# Patient Record
Sex: MALE
[De-identification: names, ages, dates, MRNs, and addresses within clinical notes are randomized per-mention and may not be internally consistent; named-entity substitution may affect disease eponyms.]

---

## 2022-11-22 ENCOUNTER — APPOINTMENT (OUTPATIENT)
Dept: UROLOGY | Facility: CLINIC | Age: 67
End: 2022-11-22

## 2022-11-22 VITALS
OXYGEN SATURATION: 98 % | WEIGHT: 215 LBS | BODY MASS INDEX: 26.73 KG/M2 | SYSTOLIC BLOOD PRESSURE: 147 MMHG | TEMPERATURE: 97.3 F | DIASTOLIC BLOOD PRESSURE: 88 MMHG | HEIGHT: 75 IN | HEART RATE: 77 BPM

## 2022-11-22 DIAGNOSIS — Z00.00 ENCOUNTER FOR GENERAL ADULT MEDICAL EXAMINATION W/OUT ABNORMAL FINDINGS: ICD-10-CM

## 2022-11-22 DIAGNOSIS — Z63.5 DISRUPTION OF FAMILY BY SEPARATION AND DIVORCE: ICD-10-CM

## 2022-11-22 PROCEDURE — 51798 US URINE CAPACITY MEASURE: CPT

## 2022-11-22 PROCEDURE — 99205 OFFICE O/P NEW HI 60 MIN: CPT

## 2022-11-22 SDOH — SOCIAL STABILITY - SOCIAL INSECURITY: DISRUPTION OF FAMILY BY SEPARATION AND DIVORCE: Z63.5

## 2022-11-22 NOTE — ASSESSMENT
[FreeTextEntry1] : Diagnosis: \par elevated PSA\par BPH\par Urinary retention, frequency, nocturia, weak stream\par Bladder stones\par Bladder diverticulum\par \par \par Plan \par PSA, MRI \par If low suspicion for prostate cancer based on MRI/PSA, then will recommend an outlet procedure (e.g. TURP) with laser cystolithopaxy. \par \par Return to office post MRI for final assessment and plan. \par \par Favio Lopez MD, FACS, FRCS \par  of Urology [facility]\par Director of Laparoscopic and Robotic Surgery \par [facility] Director of Urology, [facility] \par Professor of Urology\par \par (Office) [phone]\par (Cell)  [phone] \par [email or web address] \par \par

## 2022-11-22 NOTE — HISTORY OF PRESENT ILLNESS
[FreeTextEntry1] : Dr. Herbert Reyes\par \par Dr. Sebastián Ni\par \par Dr. Kenny Lam\par \par \par CC: Elevated PSA, BPH, bladder diverticulum, bladder stones, urinary retention\par \par HPI: \par Patient with history of elevated PSA, MRI one year ago without suspicious lesions (PIRAD1), and fairly severe urinary symptoms, with small bladder diverticulum and bladder stones.  Recurrent UTI, and urosepsis 8/2022. \par \par He has associated intermittent stream, hesitancy, straining, weak stream, "takes like 15 min to go", "squeezing it out", frequent UTI.  \par \par MRI PIRAD1 and 37 cc prostate with intravesical component, one year ago.  CT with bladder stones up to 1.3 cm; 1.7 cm diverticulum, trabeculated bladder, no LAD, prostate 5.1 cm. \par \par PVR 31 cc \par \par FAMHX: brother had prostate cancer (surgery) doing well \par SURGHX: jaw surgery \par SOCIAL: nonsmoker, retired ,  \par ROS: negative 10 system other than above, and hernia \par \par \par

## 2022-11-22 NOTE — PHYSICAL EXAM
[General Appearance - Well Developed] : well developed [General Appearance - Well Nourished] : well nourished [Normal Appearance] : normal appearance [Well Groomed] : well groomed [General Appearance - In No Acute Distress] : no acute distress [Edema] : no peripheral edema [] : no respiratory distress [Respiration, Rhythm And Depth] : normal respiratory rhythm and effort [Exaggerated Use Of Accessory Muscles For Inspiration] : no accessory muscle use [Abdomen Soft] : soft [Abdomen Tenderness] : non-tender [Abdomen Mass (___ Cm)] : no abdominal mass palpated [Abdomen Hernia] : no hernia was discovered [Costovertebral Angle Tenderness] : no ~M costovertebral angle tenderness [Urethral Meatus] : meatus normal [Penis Abnormality] : normal circumcised penis [Urinary Bladder Findings] : the bladder was normal on palpation [Scrotum] : the scrotum was normal [Epididymis] : the epididymides were normal [Testes Tenderness] : no tenderness of the testes [Testes Mass (___cm)] : there were no testicular masses [No Prostate Nodules] : no prostate nodules [Prostate Size ___ gm] : prostate size [unfilled] gm [Normal Station and Gait] : the gait and station were normal for the patient's age [No Focal Deficits] : no focal deficits [Oriented To Time, Place, And Person] : oriented to person, place, and time [Affect] : the affect was normal [Mood] : the mood was normal [Not Anxious] : not anxious [No Palpable Adenopathy] : no palpable adenopathy

## 2022-11-23 ENCOUNTER — NON-APPOINTMENT (OUTPATIENT)
Age: 67
End: 2022-11-23

## 2022-12-07 LAB — BACTERIA UR CULT: ABNORMAL

## 2023-01-17 ENCOUNTER — APPOINTMENT (OUTPATIENT)
Dept: UROLOGY | Facility: CLINIC | Age: 68
End: 2023-01-17
Payer: MEDICARE

## 2023-01-17 VITALS
HEART RATE: 93 BPM | DIASTOLIC BLOOD PRESSURE: 86 MMHG | SYSTOLIC BLOOD PRESSURE: 145 MMHG | OXYGEN SATURATION: 98 % | TEMPERATURE: 98 F

## 2023-01-17 PROCEDURE — 99214 OFFICE O/P EST MOD 30 MIN: CPT

## 2023-01-17 NOTE — HISTORY OF PRESENT ILLNESS
[FreeTextEntry1] : Dr. eHrbert Reyes\par \par Dr. Sebastián Ni\par \par Dr. Kenny Lam\par \par \par \par CC: Elevated PSA, BPH, bladder diverticulum, bladder stones, urinary retention\par \par July 2022 was hospitalized for low potassium and UTI and August 2022 hospitalized for UTI as well.\par With UTI reports weakness, " couldn't move",  ? fever\par \par He has associated intermittent stream, hesitancy, straining, weak stream, "takes like 15 min to go", "squeezing it out", nocturia, frequent UTI. \par \par Reports some dysuria at beginning of stream \par \par Prior imaging:\par MRI PIRAD1 and 37 cc prostate with intravesical component, one year ago. CT with bladder stones up to 1.3 cm; 1.7 cm diverticulum, trabeculated bladder, no LAD, prostate 5.1 cm. \par \par MRI prostate 1/12/23\par 45.0 cc gland\par PIRADs 2 lesion\par left inguinal hernia \par \par \par FAMHX: brother had prostate cancer (surgery) doing well \par SURGHX: jaw surgery \par SOCIAL: nonsmoker, retired ,  \par

## 2023-01-17 NOTE — ASSESSMENT
[FreeTextEntry1] : Diagnosis: \par BPH, LUTs, history of UTI, bladder stones\par \par \par Plan \par Discussed outlet procedure options, r/b/a and all comprehensive complications and side effects.  Discussed laser lithotripsy for his bladder stones. \par \par We discussed surveillance, all medical therapeutic options, all outlet procedures including office based, TURP, bipolar TURP, button vaporization, Rezum, Aquablation, Urolift, thulium/holmium, suprapubic/retropubic simple (open, robotic) prostatectomy.   \par \par Potential side effects of treatment were reviewed including, but not limited to: short term or permanent stress urinary incontinence and/or short term or permanent erectile dysfunction, rectal symptoms/pain, perineal pain, bleeding, rectal injury, recognized vs. unrecognized/delayed-recognition injury, TUR syndrome, risks of DVT, PE, MI, death, risks of cardiopulmonary/anesthesia related complications, positional injury, infection, ureteral injury/obstruction, bladder neck contracture, meatal stenosis, urethral stricture and other complications. \par \par My personal and institutional experience reviewed, as well as literature regarding these options.\par \par Plan for TURP and laser cystolitholopaxy\par \par Favio Lopez MD, FACS, FRCS \par  of Urology [facility]\par Director of Laparoscopic and Robotic Surgery \par [facility] Director of Urology, [facility] \par Professor of Urology\par \par (Office) [phone]\par (Cell)  [phone] \par [email or web address] \par \par \par

## 2023-02-13 LAB
ALBUMIN SERPL ELPH-MCNC: 4.6 G/DL
ALP BLD-CCNC: 137 U/L
ALT SERPL-CCNC: 10 U/L
ANION GAP SERPL CALC-SCNC: 16 MMOL/L
APPEARANCE: CLEAR
APTT BLD: 41.1 SEC
AST SERPL-CCNC: 18 U/L
BACTERIA UR CULT: ABNORMAL
BACTERIA: NEGATIVE
BASOPHILS # BLD AUTO: 0.06 K/UL
BASOPHILS NFR BLD AUTO: 0.6 %
BILIRUB SERPL-MCNC: 1.1 MG/DL
BILIRUBIN URINE: NEGATIVE
BLOOD URINE: ABNORMAL
BUN SERPL-MCNC: 13 MG/DL
CALCIUM SERPL-MCNC: 9.7 MG/DL
CHLORIDE SERPL-SCNC: 99 MMOL/L
CO2 SERPL-SCNC: 24 MMOL/L
COLOR: YELLOW
CREAT SERPL-MCNC: 0.96 MG/DL
EGFR: 87 ML/MIN/1.73M2
EOSINOPHIL # BLD AUTO: 0.23 K/UL
EOSINOPHIL NFR BLD AUTO: 2.5 %
GLUCOSE QUALITATIVE U: NEGATIVE
GLUCOSE SERPL-MCNC: 79 MG/DL
HCT VFR BLD CALC: 43.5 %
HGB BLD-MCNC: 14.9 G/DL
HYALINE CASTS: 1 /LPF
IMM GRANULOCYTES NFR BLD AUTO: 0.5 %
KETONES URINE: NEGATIVE
LEUKOCYTE ESTERASE URINE: ABNORMAL
LYMPHOCYTES # BLD AUTO: 1.81 K/UL
LYMPHOCYTES NFR BLD AUTO: 19.5 %
MAN DIFF?: NORMAL
MCHC RBC-ENTMCNC: 29.4 PG
MCHC RBC-ENTMCNC: 34.3 GM/DL
MCV RBC AUTO: 86 FL
MICROSCOPIC-UA: NORMAL
MONOCYTES # BLD AUTO: 0.71 K/UL
MONOCYTES NFR BLD AUTO: 7.7 %
NEUTROPHILS # BLD AUTO: 6.42 K/UL
NEUTROPHILS NFR BLD AUTO: 69.2 %
NITRITE URINE: POSITIVE
PH URINE: 6.5
PLATELET # BLD AUTO: 326 K/UL
POTASSIUM SERPL-SCNC: 3 MMOL/L
PROT SERPL-MCNC: 7.6 G/DL
PROTEIN URINE: NORMAL
RBC # BLD: 5.06 M/UL
RBC # FLD: 12.8 %
RED BLOOD CELLS URINE: 4 /HPF
SODIUM SERPL-SCNC: 140 MMOL/L
SPECIFIC GRAVITY URINE: 1.02
SQUAMOUS EPITHELIAL CELLS: 1 /HPF
UROBILINOGEN URINE: NORMAL
WBC # FLD AUTO: 9.28 K/UL
WHITE BLOOD CELLS URINE: 3 /HPF

## 2023-02-21 ENCOUNTER — EMERGENCY (EMERGENCY)
Facility: HOSPITAL | Age: 68
LOS: 1 days | Discharge: HOME | End: 2023-02-21
Attending: STUDENT IN AN ORGANIZED HEALTH CARE EDUCATION/TRAINING PROGRAM | Admitting: STUDENT IN AN ORGANIZED HEALTH CARE EDUCATION/TRAINING PROGRAM
Payer: MEDICARE

## 2023-02-21 VITALS
WEIGHT: 214.95 LBS | DIASTOLIC BLOOD PRESSURE: 85 MMHG | HEIGHT: 74 IN | RESPIRATION RATE: 18 BRPM | TEMPERATURE: 98 F | OXYGEN SATURATION: 94 % | HEART RATE: 70 BPM | SYSTOLIC BLOOD PRESSURE: 165 MMHG

## 2023-02-21 VITALS
DIASTOLIC BLOOD PRESSURE: 87 MMHG | HEART RATE: 59 BPM | TEMPERATURE: 98 F | SYSTOLIC BLOOD PRESSURE: 160 MMHG | RESPIRATION RATE: 17 BRPM | OXYGEN SATURATION: 98 %

## 2023-02-21 DIAGNOSIS — Z87.19 PERSONAL HISTORY OF OTHER DISEASES OF THE DIGESTIVE SYSTEM: ICD-10-CM

## 2023-02-21 DIAGNOSIS — N39.0 URINARY TRACT INFECTION, SITE NOT SPECIFIED: ICD-10-CM

## 2023-02-21 DIAGNOSIS — Z87.438 PERSONAL HISTORY OF OTHER DISEASES OF MALE GENITAL ORGANS: ICD-10-CM

## 2023-02-21 DIAGNOSIS — K40.90 UNILATERAL INGUINAL HERNIA, WITHOUT OBSTRUCTION OR GANGRENE, NOT SPECIFIED AS RECURRENT: ICD-10-CM

## 2023-02-21 DIAGNOSIS — R11.0 NAUSEA: ICD-10-CM

## 2023-02-21 DIAGNOSIS — R22.2 LOCALIZED SWELLING, MASS AND LUMP, TRUNK: ICD-10-CM

## 2023-02-21 LAB
ALBUMIN SERPL ELPH-MCNC: 4.3 G/DL — SIGNIFICANT CHANGE UP (ref 3.3–5)
ALP SERPL-CCNC: 132 U/L — HIGH (ref 40–120)
ALT FLD-CCNC: 11 U/L — SIGNIFICANT CHANGE UP (ref 10–45)
ANION GAP SERPL CALC-SCNC: 12 MMOL/L — SIGNIFICANT CHANGE UP (ref 5–17)
APPEARANCE UR: CLEAR — SIGNIFICANT CHANGE UP
AST SERPL-CCNC: 15 U/L — SIGNIFICANT CHANGE UP (ref 10–40)
BACTERIA # UR AUTO: ABNORMAL /HPF
BASOPHILS # BLD AUTO: 0.07 K/UL — SIGNIFICANT CHANGE UP (ref 0–0.2)
BASOPHILS NFR BLD AUTO: 0.7 % — SIGNIFICANT CHANGE UP (ref 0–2)
BILIRUB SERPL-MCNC: 1.7 MG/DL — HIGH (ref 0.2–1.2)
BILIRUB UR-MCNC: NEGATIVE — SIGNIFICANT CHANGE UP
BUN SERPL-MCNC: 12 MG/DL — SIGNIFICANT CHANGE UP (ref 7–23)
CALCIUM SERPL-MCNC: 9.4 MG/DL — SIGNIFICANT CHANGE UP (ref 8.4–10.5)
CHLORIDE SERPL-SCNC: 100 MMOL/L — SIGNIFICANT CHANGE UP (ref 96–108)
CO2 SERPL-SCNC: 26 MMOL/L — SIGNIFICANT CHANGE UP (ref 22–31)
COLOR SPEC: YELLOW — SIGNIFICANT CHANGE UP
COMMENT - URINE: SIGNIFICANT CHANGE UP
CREAT SERPL-MCNC: 0.95 MG/DL — SIGNIFICANT CHANGE UP (ref 0.5–1.3)
DIFF PNL FLD: ABNORMAL
EGFR: 88 ML/MIN/1.73M2 — SIGNIFICANT CHANGE UP
EOSINOPHIL # BLD AUTO: 0.11 K/UL — SIGNIFICANT CHANGE UP (ref 0–0.5)
EOSINOPHIL NFR BLD AUTO: 1.1 % — SIGNIFICANT CHANGE UP (ref 0–6)
EPI CELLS # UR: SIGNIFICANT CHANGE UP /HPF (ref 0–5)
GLUCOSE SERPL-MCNC: 90 MG/DL — SIGNIFICANT CHANGE UP (ref 70–99)
GLUCOSE UR QL: NEGATIVE — SIGNIFICANT CHANGE UP
HCT VFR BLD CALC: 42.1 % — SIGNIFICANT CHANGE UP (ref 39–50)
HGB BLD-MCNC: 14.8 G/DL — SIGNIFICANT CHANGE UP (ref 13–17)
IMM GRANULOCYTES NFR BLD AUTO: 0.5 % — SIGNIFICANT CHANGE UP (ref 0–0.9)
KETONES UR-MCNC: NEGATIVE — SIGNIFICANT CHANGE UP
LACTATE SERPL-SCNC: 0.9 MMOL/L — SIGNIFICANT CHANGE UP (ref 0.5–2)
LEUKOCYTE ESTERASE UR-ACNC: ABNORMAL
LIDOCAIN IGE QN: 26 U/L — SIGNIFICANT CHANGE UP (ref 7–60)
LYMPHOCYTES # BLD AUTO: 1.77 K/UL — SIGNIFICANT CHANGE UP (ref 1–3.3)
LYMPHOCYTES # BLD AUTO: 18 % — SIGNIFICANT CHANGE UP (ref 13–44)
MCHC RBC-ENTMCNC: 29.7 PG — SIGNIFICANT CHANGE UP (ref 27–34)
MCHC RBC-ENTMCNC: 35.2 GM/DL — SIGNIFICANT CHANGE UP (ref 32–36)
MCV RBC AUTO: 84.4 FL — SIGNIFICANT CHANGE UP (ref 80–100)
MONOCYTES # BLD AUTO: 0.86 K/UL — SIGNIFICANT CHANGE UP (ref 0–0.9)
MONOCYTES NFR BLD AUTO: 8.8 % — SIGNIFICANT CHANGE UP (ref 2–14)
NEUTROPHILS # BLD AUTO: 6.96 K/UL — SIGNIFICANT CHANGE UP (ref 1.8–7.4)
NEUTROPHILS NFR BLD AUTO: 70.9 % — SIGNIFICANT CHANGE UP (ref 43–77)
NITRITE UR-MCNC: POSITIVE
NRBC # BLD: 0 /100 WBCS — SIGNIFICANT CHANGE UP (ref 0–0)
PH UR: 6.5 — SIGNIFICANT CHANGE UP (ref 5–8)
PLATELET # BLD AUTO: 302 K/UL — SIGNIFICANT CHANGE UP (ref 150–400)
POTASSIUM SERPL-MCNC: 3.1 MMOL/L — LOW (ref 3.5–5.3)
POTASSIUM SERPL-SCNC: 3.1 MMOL/L — LOW (ref 3.5–5.3)
PROT SERPL-MCNC: 7.7 G/DL — SIGNIFICANT CHANGE UP (ref 6–8.3)
PROT UR-MCNC: ABNORMAL MG/DL
RBC # BLD: 4.99 M/UL — SIGNIFICANT CHANGE UP (ref 4.2–5.8)
RBC # FLD: 12.6 % — SIGNIFICANT CHANGE UP (ref 10.3–14.5)
RBC CASTS # UR COMP ASSIST: ABNORMAL /HPF
SODIUM SERPL-SCNC: 138 MMOL/L — SIGNIFICANT CHANGE UP (ref 135–145)
SP GR SPEC: 1.02 — SIGNIFICANT CHANGE UP (ref 1–1.03)
UROBILINOGEN FLD QL: 0.2 E.U./DL — SIGNIFICANT CHANGE UP
WBC # BLD: 9.82 K/UL — SIGNIFICANT CHANGE UP (ref 3.8–10.5)
WBC # FLD AUTO: 9.82 K/UL — SIGNIFICANT CHANGE UP (ref 3.8–10.5)
WBC UR QL: > 10 /HPF

## 2023-02-21 PROCEDURE — 83690 ASSAY OF LIPASE: CPT

## 2023-02-21 PROCEDURE — 74177 CT ABD & PELVIS W/CONTRAST: CPT | Mod: MG

## 2023-02-21 PROCEDURE — 85025 COMPLETE CBC W/AUTO DIFF WBC: CPT

## 2023-02-21 PROCEDURE — 99285 EMERGENCY DEPT VISIT HI MDM: CPT

## 2023-02-21 PROCEDURE — 99284 EMERGENCY DEPT VISIT MOD MDM: CPT | Mod: 25

## 2023-02-21 PROCEDURE — 87086 URINE CULTURE/COLONY COUNT: CPT

## 2023-02-21 PROCEDURE — G1004: CPT

## 2023-02-21 PROCEDURE — 74177 CT ABD & PELVIS W/CONTRAST: CPT | Mod: 26,MG

## 2023-02-21 PROCEDURE — 83605 ASSAY OF LACTIC ACID: CPT

## 2023-02-21 PROCEDURE — 80053 COMPREHEN METABOLIC PANEL: CPT

## 2023-02-21 PROCEDURE — 81001 URINALYSIS AUTO W/SCOPE: CPT

## 2023-02-21 PROCEDURE — 96374 THER/PROPH/DIAG INJ IV PUSH: CPT | Mod: XU

## 2023-02-21 PROCEDURE — 36415 COLL VENOUS BLD VENIPUNCTURE: CPT

## 2023-02-21 RX ORDER — DIATRIZOATE MEGLUMINE 180 MG/ML
30 INJECTION, SOLUTION INTRAVESICAL ONCE
Refills: 0 | Status: COMPLETED | OUTPATIENT
Start: 2023-02-21 | End: 2023-02-21

## 2023-02-21 RX ORDER — SODIUM CHLORIDE 9 MG/ML
1000 INJECTION INTRAMUSCULAR; INTRAVENOUS; SUBCUTANEOUS ONCE
Refills: 0 | Status: COMPLETED | OUTPATIENT
Start: 2023-02-21 | End: 2023-02-21

## 2023-02-21 RX ORDER — CEFTRIAXONE 500 MG/1
1000 INJECTION, POWDER, FOR SOLUTION INTRAMUSCULAR; INTRAVENOUS ONCE
Refills: 0 | Status: COMPLETED | OUTPATIENT
Start: 2023-02-21 | End: 2023-02-21

## 2023-02-21 RX ORDER — CEFPODOXIME PROXETIL 100 MG
1 TABLET ORAL
Qty: 20 | Refills: 0
Start: 2023-02-21 | End: 2023-03-02

## 2023-02-21 RX ORDER — ACETAMINOPHEN 500 MG
1 TABLET ORAL
Qty: 60 | Refills: 0
Start: 2023-02-21

## 2023-02-21 RX ORDER — MESALAMINE 400 MG
1 TABLET, DELAYED RELEASE (ENTERIC COATED) ORAL
Qty: 60 | Refills: 0
Start: 2023-02-21 | End: 2023-03-22

## 2023-02-21 RX ORDER — POTASSIUM CHLORIDE 20 MEQ
40 PACKET (EA) ORAL ONCE
Refills: 0 | Status: COMPLETED | OUTPATIENT
Start: 2023-02-21 | End: 2023-02-21

## 2023-02-21 RX ADMIN — DIATRIZOATE MEGLUMINE 30 MILLILITER(S): 180 INJECTION, SOLUTION INTRAVESICAL at 13:20

## 2023-02-21 RX ADMIN — SODIUM CHLORIDE 1000 MILLILITER(S): 9 INJECTION INTRAMUSCULAR; INTRAVENOUS; SUBCUTANEOUS at 13:20

## 2023-02-21 RX ADMIN — CEFTRIAXONE 100 MILLIGRAM(S): 500 INJECTION, POWDER, FOR SOLUTION INTRAMUSCULAR; INTRAVENOUS at 14:17

## 2023-02-21 RX ADMIN — Medication 40 MILLIEQUIVALENT(S): at 14:22

## 2023-02-21 NOTE — ED PROVIDER NOTE - NS ED ATTENDING STATEMENT MOD
This was a shared visit with the ELVIRA. I reviewed and verified the documentation and independently performed the documented:

## 2023-02-21 NOTE — ED PROVIDER NOTE - PATIENT PORTAL LINK FT
You can access the FollowMyHealth Patient Portal offered by [facility] by registering at the following website: [email or web address]. By joining [facility]’s FollowMyHealth portal, you will also be able to view your health information using other applications (apps) compatible with our system.

## 2023-02-21 NOTE — ED ADULT NURSE NOTE - OBJECTIVE STATEMENT
67M presents c/o left groin "burning" related to a hernia and nausea. pt states he has had the hernia for a long time but his MD does not want to operate until he has prostate surgery in March. pt also reports that he currently has a kidney stone that is too large to pass so when he gets his prostate surgery they will remove the stone. denies fevers/chills. +dysuria.

## 2023-02-21 NOTE — ED PROVIDER NOTE - PROGRESS NOTE DETAILS
CT a/p unremarkable, pt in no pain, will d/c with rx cefpodoxime, requested refill of his mesalamine, will f/u with Dr. Reyes, return precautions for worsening symptoms

## 2023-02-21 NOTE — ED PROVIDER NOTE - OBJECTIVE STATEMENT
66 y/o M with PMHx left inguinal hernia and BPH presents to ED c/o bulging to left lower abdomen/inguinal area this morning with pain. Pt reports that for a few moments this morning, pain was strong. Pain somewhat subsided so pt called his surgeon and was referred to ED. Pt reports he still has mild discomfort and thinks the area is slightly bulging. Also with nausea but no vomiting today and mild dysuria over the past few days. Denies fevers or chills.

## 2023-02-21 NOTE — ED PROVIDER NOTE - CLINICAL SUMMARY MEDICAL DECISION MAKING FREE TEXT BOX
66 y/o M with PMHx left inguinal hernia and BPH presents to ED c/o left lower abdominal bulging and pain that began this morning. VS are unremarkable in ED. Hernia reduced at bedside. No concern for incarcerated hernia. Pt reports severe pain this morning and urinary symptoms. Labs sent and show normal lactate and low potassium which was repleted. UA consistent with infection. Pt given ceftriaxone. Plan for CT a/p. If CT has no acute findings, pt can be discharged to f/u with his surgeon outpatient on abx for UTI.

## 2023-02-21 NOTE — ED PROVIDER NOTE - GASTROINTESTINAL, MLM
Abdomen soft, non-tender. Left inguinal hernia noted and easily reproducible. No overlying ecchymosis. No CVA tenderness.

## 2023-02-22 LAB
CULTURE RESULTS: SIGNIFICANT CHANGE UP
SPECIMEN SOURCE: SIGNIFICANT CHANGE UP

## 2023-02-24 PROBLEM — N40.0 BENIGN PROSTATIC HYPERPLASIA WITHOUT LOWER URINARY TRACT SYMPTOMS: Chronic | Status: ACTIVE | Noted: 2023-02-21

## 2023-02-24 PROBLEM — K40.90 UNILATERAL INGUINAL HERNIA, WITHOUT OBSTRUCTION OR GANGRENE, NOT SPECIFIED AS RECURRENT: Chronic | Status: ACTIVE | Noted: 2023-02-21

## 2023-03-15 ENCOUNTER — APPOINTMENT (OUTPATIENT)
Dept: UROLOGY | Facility: AMBULATORY SURGERY CENTER | Age: 68
End: 2023-03-15

## 2023-03-15 ENCOUNTER — LABORATORY RESULT (OUTPATIENT)
Age: 68
End: 2023-03-15

## 2023-03-15 PROBLEM — I10 ESSENTIAL (PRIMARY) HYPERTENSION: Chronic | Status: ACTIVE | Noted: 2023-03-14

## 2023-03-16 ENCOUNTER — APPOINTMENT (OUTPATIENT)
Dept: HEMATOLOGY ONCOLOGY | Facility: CLINIC | Age: 68
End: 2023-03-16
Payer: MEDICARE

## 2023-03-16 LAB
APTT 2H P 1:4 NP PPP: 33.7 SEC
APTT 2H P INC PPP: 34.7 SEC
APTT IMM NP/PRE NP PPP: 32.9 SEC
APTT INV RATIO PPP: 38.9 SEC
FACT IX ACT/NOR PPP: 135 %
FACT VIII ACT/NOR PPP: 23 %
FACT XI ACT/NOR PPP: 85 %
FACT XII PPP CHRO-ACNC: 120 %
NPP NORMAL POOLED PLASMA: 32 SECS

## 2023-03-16 PROCEDURE — 99205 OFFICE O/P NEW HI 60 MIN: CPT | Mod: 95

## 2023-03-16 NOTE — ASSESSMENT
[FreeTextEntry1] : Repeat blood work done... Patient's PTT corrects with normal plasma and lupus anticoagulants were negative...\par \par Factor XI and XII were within normal limits, however his factor VIII was low... I added von Willebrand panel testing to existing bloods in the lab, and once results available we will discuss with patient and Dr. Lopez.

## 2023-03-16 NOTE — HISTORY OF PRESENT ILLNESS
[Home] : at home, [unfilled] , at the time of the visit. [Other Location: e.g. Home (Enter Location, City,State)___] : at [unfilled] [Family Member] : family member [Other:____] : [unfilled] [Verbal consent obtained from patient] : the patient, [unfilled] [de-identified] : 67 years old Lutheran male was found to have a prolonged PTT prior to TURP... Patient denies history of excess bleeding in the past, when he had upper and lower endoscopies... He was never told his PTT is prolonged... He denies bleeding with dental procedures.\par \par He has a known hypokalemia and was prescribed potassium pills in the past, however he is not taking them because of constipation.

## 2023-03-20 LAB
ALBUMIN SERPL ELPH-MCNC: 4.3 G/DL
ALP BLD-CCNC: 115 U/L
ALT SERPL-CCNC: 14 U/L
ANION GAP SERPL CALC-SCNC: 15 MMOL/L
AST SERPL-CCNC: 17 U/L
BILIRUB SERPL-MCNC: 1.1 MG/DL
BUN SERPL-MCNC: 12 MG/DL
CALCIUM SERPL-MCNC: 9.6 MG/DL
CHLORIDE SERPL-SCNC: 100 MMOL/L
CO2 SERPL-SCNC: 24 MMOL/L
CREAT SERPL-MCNC: 0.88 MG/DL
EGFR: 95 ML/MIN/1.73M2
GLUCOSE SERPL-MCNC: 89 MG/DL
POTASSIUM SERPL-SCNC: 3 MMOL/L
PROT SERPL-MCNC: 7 G/DL
SODIUM SERPL-SCNC: 140 MMOL/L

## 2023-03-21 ENCOUNTER — APPOINTMENT (OUTPATIENT)
Dept: NEPHROLOGY | Facility: CLINIC | Age: 68
End: 2023-03-21
Payer: MEDICARE

## 2023-03-21 VITALS — SYSTOLIC BLOOD PRESSURE: 143 MMHG | DIASTOLIC BLOOD PRESSURE: 83 MMHG | HEART RATE: 67 BPM

## 2023-03-21 DIAGNOSIS — E87.6 HYPOKALEMIA: ICD-10-CM

## 2023-03-21 PROCEDURE — 99204 OFFICE O/P NEW MOD 45 MIN: CPT

## 2023-03-21 RX ORDER — IBUPROFEN 800 MG/1
800 TABLET, FILM COATED ORAL 3 TIMES DAILY
Refills: 0 | Status: ACTIVE | COMMUNITY
Start: 2023-03-21

## 2023-03-21 RX ORDER — MESALAMINE 1.2 G/1
1.2 TABLET, DELAYED RELEASE ORAL TWICE DAILY
Refills: 0 | Status: ACTIVE | COMMUNITY
Start: 2023-03-21

## 2023-03-21 RX ORDER — AMLODIPINE BESYLATE 10 MG/1
10 TABLET ORAL DAILY
Refills: 0 | Status: ACTIVE | COMMUNITY
Start: 2023-03-21

## 2023-03-21 RX ORDER — POTASSIUM CHLORIDE 1500 MG/1
20 TABLET, FILM COATED, EXTENDED RELEASE ORAL EVERY OTHER DAY
Refills: 0 | Status: ACTIVE | COMMUNITY
Start: 2023-03-21

## 2023-03-21 RX ORDER — TAMSULOSIN HYDROCHLORIDE 0.4 MG/1
0.4 CAPSULE ORAL DAILY
Refills: 0 | Status: ACTIVE | COMMUNITY
Start: 2023-03-21

## 2023-03-21 RX ORDER — OMEPRAZOLE 40 MG/1
40 CAPSULE, DELAYED RELEASE ORAL DAILY
Refills: 0 | Status: ACTIVE | COMMUNITY
Start: 2023-03-21

## 2023-03-23 ENCOUNTER — NON-APPOINTMENT (OUTPATIENT)
Age: 68
End: 2023-03-23

## 2023-03-23 LAB
ALBUMIN SERPL ELPH-MCNC: 4.5 G/DL
ALDOSTERONE SERUM: 19.3 NG/DL
ALP BLD-CCNC: 121 U/L
ALT SERPL-CCNC: 10 U/L
ANION GAP SERPL CALC-SCNC: 12 MMOL/L
APPEARANCE: CLEAR
AST SERPL-CCNC: 14 U/L
BACTERIA: ABNORMAL
BILIRUB SERPL-MCNC: 1.2 MG/DL
BILIRUBIN URINE: NEGATIVE
BLOOD URINE: NEGATIVE
BUN SERPL-MCNC: 13 MG/DL
CALCIUM SERPL-MCNC: 9.8 MG/DL
CHLORIDE SERPL-SCNC: 101 MMOL/L
CO2 SERPL-SCNC: 25 MMOL/L
COLOR: YELLOW
CREAT SERPL-MCNC: 1.01 MG/DL
CREAT SPEC-SCNC: 232 MG/DL
EGFR: 82 ML/MIN/1.73M2
GLUCOSE QUALITATIVE U: NEGATIVE
GLUCOSE SERPL-MCNC: 81 MG/DL
HYALINE CASTS: 0 /LPF
KETONES URINE: NEGATIVE
LEUKOCYTE ESTERASE URINE: ABNORMAL
MAGNESIUM SERPL-MCNC: 2.2 MG/DL
MICROSCOPIC-UA: NORMAL
NITRITE URINE: NEGATIVE
PH URINE: 6.5
PHOSPHATE SERPL-MCNC: 3.5 MG/DL
POTASSIUM SERPL-SCNC: 3.4 MMOL/L
POTASSIUM UR-SCNC: 83.4 MMOL/L
PROT SERPL-MCNC: 7.1 G/DL
PROTEIN URINE: ABNORMAL
RED BLOOD CELLS URINE: 2 /HPF
RENIN PLASMA: 27 PG/ML
SODIUM SERPL-SCNC: 138 MMOL/L
SPECIFIC GRAVITY URINE: 1.03
SQUAMOUS EPITHELIAL CELLS: 0 /HPF
URATE SERPL-MCNC: 4.8 MG/DL
UROBILINOGEN URINE: NORMAL
WHITE BLOOD CELLS URINE: 242 /HPF

## 2023-03-23 NOTE — PHYSICAL EXAM
[General Appearance - Alert] : alert [General Appearance - In No Acute Distress] : in no acute distress [General Appearance - Well Nourished] : well nourished [General Appearance - Well Developed] : well developed [Sclera] : the sclera and conjunctiva were normal [Hearing Threshold Finger Rub Not [place]] : hearing was normal [Neck Appearance] : the appearance of the neck was normal [Respiration, Rhythm And Depth] : normal respiratory rhythm and effort [Exaggerated Use Of Accessory Muscles For Inspiration] : no accessory muscle use [Heart Sounds] : normal S1 and S2 [Edema] : there was no peripheral edema [Abdomen Soft] : soft [No CVA Tenderness] : no ~M costovertebral angle tenderness [Musculoskeletal - Swelling] : no joint swelling seen [] : no rash [Motor Exam] : the motor exam was normal [Oriented To Time, Place, And Person] : oriented to person, place, and time [FreeTextEntry1] : Lt inguinal hernia

## 2023-03-23 NOTE — HISTORY OF PRESENT ILLNESS
[FreeTextEntry1] : asked to see by Dr Lopez  for hypokalemia \par 68 yo M w/ HTN, Crohns disease, OA, BPH, inguinal hernia referred for evaluation of hypokalemia. Pt was scheduled to undergo TURP on 3/15 for BPH causing frequent UTIs, but procedure was cancelled due to prolonged PTT s/p hematology following. Urology also wanted hypokalemia to be evaluated.\par Pt says he has had hypokalemia for a few years now and recalls that it might since around the time when he was diagnosed with HTN 6-7 years ago. He was taking a "water pill" before which was stopped a year ago. Pt also with CD flares where he has severe diarrhea, flares were previously 2-3 times/mo now since starting mesalamine a year ago, one flare every 2-3 months, last one being a couple of weeks ago\par ROS positive for nocturia, weak urinary stream and straining while urinating. Also pain in knees due to OA. Also intermitted pain from L inguinal hernia especially whenever he is constipated\par meds reviewed-- taking kcl qod (decided daily  "too  much") since a recent  admission when had hypokalemia

## 2023-03-23 NOTE — CONSULT LETTER
[Dear  ___] : Dear  [unfilled], [Consult Letter:] : I had the pleasure of evaluating your patient, [unfilled]. [Please see my note below.] : Please see my note below. [Consult Closing:] : Thank you very much for allowing me to participate in the care of this patient.  If you have any questions, please do not hesitate to contact me. [Sincerely,] : Sincerely, [FreeTextEntry3] : Carlos Glez MD, LEANN\par Division of Nephrology\par [facility]\par Associate Professor of Medicine\par [employer] [facility] [employer] [employer] \par \par \par \par \par

## 2023-03-23 NOTE — HISTORY OF PRESENT ILLNESS
[FreeTextEntry1] : asked to see by Dr Lopez  for hypokalemia \par 66 yo M w/ HTN, Crohns disease, OA, BPH, inguinal hernia referred for evaluation of hypokalemia. Pt was scheduled to undergo TURP on 3/15 for BPH causing frequent UTIs, but procedure was cancelled due to prolonged PTT s/p hematology following. Urology also wanted hypokalemia to be evaluated.\par Pt says he has had hypokalemia for a few years now and recalls that it might since around the time when he was diagnosed with HTN 6-7 years ago. He was taking a "water pill" before which was stopped a year ago. Pt also with CD flares where he has severe diarrhea, flares were previously 2-3 times/mo now since starting mesalamine a year ago, one flare every 2-3 months, last one being a couple of weeks ago\par ROS positive for nocturia, weak urinary stream and straining while urinating. Also pain in knees due to OA. Also intermitted pain from L inguinal hernia especially whenever he is constipated\par meds reviewed-- taking kcl qod (decided daily  "too  much") since a recent  admission when had hypokalemia

## 2023-03-23 NOTE — ASSESSMENT
[FreeTextEntry1] : hypokalemia- chronic at least several years \par Last K 3.0, pt had a CD flare a week prior to this lab\par hypokalemia may be due to GI losses from CD flares. However, also w/ elevated BP and need to rule out primary hyperaldosterone  state (of note, hco3 not elevated and would expect alkalosis as well with hyperaldo) \par Will check BMP, Mg, urine lytes, blood renin and aldosterone levels\par confirm home kcl dose --  Depending on labs today, will likely need to increase dosing\par BP suboptimal. Encouraged to check at home and keep log. If remains elevated or if evidence renal k wasting, may add PALOMO agent (ACEi or ARB)  or MRA such as eplerenone which will help with hypoK as well\par discuss after data \par Should be stable for OR once K in normal range\par \par addendum-- discussed possible medications for pt as above with daughter - she is anxious abt using diuretic such as MRA with his bowel disease -- will try ARB instead \par

## 2023-03-23 NOTE — REVIEW OF SYSTEMS
[As Noted in HPI] : as noted in HPI [Negative] : Psychiatric [Fever] : no fever [Chills] : no chills [Chest Pain] : no chest pain [Palpitations] : no palpitations [Cough] : no cough [SOB on Exertion] : no shortness of breath during exertion

## 2023-03-23 NOTE — PHYSICAL EXAM
[General Appearance - Alert] : alert [General Appearance - In No Acute Distress] : in no acute distress [General Appearance - Well Nourished] : well nourished [General Appearance - Well Developed] : well developed [Sclera] : the sclera and conjunctiva were normal [Hearing Threshold Finger Rub Not [place]] : hearing was normal [Neck Appearance] : the appearance of the neck was normal [Respiration, Rhythm And Depth] : normal respiratory rhythm and effort [Exaggerated Use Of Accessory Muscles For Inspiration] : no accessory muscle use [Heart Sounds] : normal S1 and S2 [Edema] : there was no peripheral edema [Abdomen Soft] : soft [No CVA Tenderness] : no ~M costovertebral angle tenderness [Musculoskeletal - Swelling] : no joint swelling seen [] : no rash [Motor Exam] : the motor exam was normal [Oriented To Time, Place, And Person] : oriented to person, place, and time [FreeTextEntry1] : Lt inguinal hernia

## 2023-03-27 LAB
BACTERIA UR CULT: ABNORMAL
RENIN ACTIVITY, PLASMA: 2.31 NG/ML/HR

## 2023-05-11 ENCOUNTER — APPOINTMENT (OUTPATIENT)
Dept: HEMATOLOGY ONCOLOGY | Facility: CLINIC | Age: 68
End: 2023-05-11
Payer: MEDICARE

## 2023-05-11 DIAGNOSIS — R79.89 OTHER SPECIFIED ABNORMAL FINDINGS OF BLOOD CHEMISTRY: ICD-10-CM

## 2023-05-11 DIAGNOSIS — K50.90 CROHN'S DISEASE, UNSPECIFIED, W/OUT COMPLICATIONS: ICD-10-CM

## 2023-05-11 PROCEDURE — 99214 OFFICE O/P EST MOD 30 MIN: CPT | Mod: 95

## 2023-05-16 ENCOUNTER — LABORATORY RESULT (OUTPATIENT)
Age: 68
End: 2023-05-16

## 2023-05-17 DIAGNOSIS — E72.11 HOMOCYSTINURIA: ICD-10-CM

## 2023-05-17 LAB
25(OH)D3 SERPL-MCNC: 20.8 NG/ML
ALBUMIN SERPL ELPH-MCNC: 4.4 G/DL
ALP BLD-CCNC: 116 U/L
ALT SERPL-CCNC: 11 U/L
ANION GAP SERPL CALC-SCNC: 15 MMOL/L
APPEARANCE: CLEAR
APTT BLD: 39.9 SEC
AST SERPL-CCNC: 14 U/L
BASOPHILS # BLD AUTO: 0.06 K/UL
BASOPHILS NFR BLD AUTO: 0.7 %
BILIRUB SERPL-MCNC: 0.8 MG/DL
BILIRUBIN URINE: NEGATIVE
BLOOD URINE: NEGATIVE
BUN SERPL-MCNC: 13 MG/DL
CALCIUM SERPL-MCNC: 9.5 MG/DL
CHLORIDE SERPL-SCNC: 100 MMOL/L
CO2 SERPL-SCNC: 22 MMOL/L
COLOR: YELLOW
CREAT SERPL-MCNC: 0.96 MG/DL
EGFR: 87 ML/MIN/1.73M2
EOSINOPHIL # BLD AUTO: 0.24 K/UL
EOSINOPHIL NFR BLD AUTO: 2.6 %
ERYTHROCYTE [SEDIMENTATION RATE] IN BLOOD BY WESTERGREN METHOD: 26 MM/HR
FERRITIN SERPL-MCNC: 56 NG/ML
GLUCOSE QUALITATIVE U: NEGATIVE MG/DL
GLUCOSE SERPL-MCNC: 73 MG/DL
HCT VFR BLD CALC: 40.9 %
HGB BLD-MCNC: 14 G/DL
IMM GRANULOCYTES NFR BLD AUTO: 0.5 %
IRON SATN MFR SERPL: 19 %
IRON SERPL-MCNC: 65 UG/DL
KETONES URINE: NEGATIVE MG/DL
LDH SERPL-CCNC: 168 U/L
LEUKOCYTE ESTERASE URINE: NEGATIVE
LYMPHOCYTES # BLD AUTO: 1.77 K/UL
LYMPHOCYTES NFR BLD AUTO: 19.3 %
MAN DIFF?: NORMAL
MCHC RBC-ENTMCNC: 29.9 PG
MCHC RBC-ENTMCNC: 34.2 GM/DL
MCV RBC AUTO: 87.2 FL
MONOCYTES # BLD AUTO: 0.68 K/UL
MONOCYTES NFR BLD AUTO: 7.4 %
NEUTROPHILS # BLD AUTO: 6.36 K/UL
NEUTROPHILS NFR BLD AUTO: 69.5 %
NITRITE URINE: NEGATIVE
PH URINE: 6.5
PLATELET # BLD AUTO: 252 K/UL
POTASSIUM SERPL-SCNC: 3.3 MMOL/L
PROT SERPL-MCNC: 7.2 G/DL
PROTEIN URINE: NORMAL MG/DL
RBC # BLD: 4.69 M/UL
RBC # FLD: 13 %
SODIUM SERPL-SCNC: 136 MMOL/L
SPECIFIC GRAVITY URINE: 1.02
TIBC SERPL-MCNC: 340 UG/DL
TSH SERPL-ACNC: 1.64 UIU/ML
UIBC SERPL-MCNC: 275 UG/DL
UROBILINOGEN URINE: 1 MG/DL
VIT B12 SERPL-MCNC: 280 PG/ML
VWF AG PPP IA-ACNC: 117 %
VWF:RCO ACT/NOR PPP PL AGG: 85 %
WBC # FLD AUTO: 9.16 K/UL

## 2023-05-31 PROBLEM — R79.89 LOW SERUM VITAMIN D: Status: ACTIVE | Noted: 2023-05-31

## 2023-05-31 LAB — FACT VIII ACT/NOR PPP: 24 %

## 2023-05-31 NOTE — HISTORY OF PRESENT ILLNESS
[Home] : at home, [unfilled] , at the time of the visit. [Other Location: e.g. Home (Enter Location, City,State)___] : at [unfilled] [Family Member] : family member [Other:____] : [unfilled] [Verbal consent obtained from patient] : the patient, [unfilled] [de-identified] : 67 years old [employer] male was found to have a prolonged PTT prior to TURP... Patient denies history of excess bleeding in the past, when he had upper and lower endoscopies... He was never told his PTT is prolonged... He denies bleeding with dental procedures.\par \par He has a known hypokalemia and was prescribed potassium pills in the past, however he is not taking them because of constipation. [de-identified] : May 11, 2023 patient was found to have a very low factor VIII level of 23%... He denies bleeding with general surgery and wisdom teeth removal... He never got around to doing the repeat blood work...

## 2023-05-31 NOTE — ASSESSMENT
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

## 2023-06-13 LAB — VWF MULTIMERS PPP IA-ACNC: NORMAL

## 2023-06-14 ENCOUNTER — APPOINTMENT (OUTPATIENT)
Dept: UROLOGY | Facility: HOSPITAL | Age: 68
End: 2023-06-14

## 2023-06-26 ENCOUNTER — LABORATORY RESULT (OUTPATIENT)
Age: 68
End: 2023-06-26

## 2023-06-26 ENCOUNTER — APPOINTMENT (OUTPATIENT)
Dept: HEMATOLOGY ONCOLOGY | Facility: CLINIC | Age: 68
End: 2023-06-26
Payer: MEDICARE

## 2023-06-26 VITALS
HEIGHT: 75 IN | HEART RATE: 68 BPM | DIASTOLIC BLOOD PRESSURE: 80 MMHG | SYSTOLIC BLOOD PRESSURE: 142 MMHG | WEIGHT: 206 LBS | OXYGEN SATURATION: 95 % | TEMPERATURE: 96.9 F | BODY MASS INDEX: 25.61 KG/M2

## 2023-06-26 DIAGNOSIS — E53.8 DEFICIENCY OF OTHER SPECIFIED B GROUP VITAMINS: ICD-10-CM

## 2023-06-26 PROCEDURE — 99204 OFFICE O/P NEW MOD 45 MIN: CPT | Mod: 25

## 2023-06-26 PROCEDURE — 36415 COLL VENOUS BLD VENIPUNCTURE: CPT

## 2023-06-26 RX ORDER — ASCORBIC ACID 125 MG
TABLET,CHEWABLE ORAL
Refills: 0 | Status: ACTIVE | COMMUNITY

## 2023-06-26 NOTE — REVIEW OF SYSTEMS
[Joint Pain] : joint pain [Joint Stiffness] : joint stiffness [Negative] : Allergic/Immunologic [Fever] : no fever [Chills] : no chills [Night Sweats] : no night sweats [Fatigue] : no fatigue [Recent Change In Weight] : ~T no recent weight change [Vision Problems] : no vision problems [Nosebleeds] : no nosebleeds [Chest Pain] : no chest pain [Shortness Of Breath] : no shortness of breath [Abdominal Pain] : no abdominal pain [Skin Rash] : no skin rash [Easy Bleeding] : no tendency for easy bleeding [Easy Bruising] : no tendency for easy bruising [FreeTextEntry4] : No gingival bleeding [FreeTextEntry7] : Has hiatus hernia, has Crohn's disease [de-identified] : has intermittent diarrhea, gas, bloating, especially when overeating [de-identified] : Has Crohn's Disease [FreeTextEntry9] : Both knees, has "bone on bone"

## 2023-06-26 NOTE — PHYSICAL EXAM
[Normal] : affect appropriate [de-identified] : overweight [de-identified] : No bleeding from gingiva or mucus membranes [de-identified] : No ecchymoses or petechiae

## 2023-06-26 NOTE — CONSULT LETTER
[Dear  ___] : Dear  [unfilled], [Consult Letter:] : I had the pleasure of evaluating your patient, [unfilled]. [( Thank you for referring [unfilled] for consultation for _____ )] : Thank you for referring [unfilled] for consultation for [unfilled] [Please see my note below.] : Please see my note below. [Consult Closing:] : Thank you very much for allowing me to participate in the care of this patient.  If you have any questions, please do not hesitate to contact me. [Sincerely,] : Sincerely, [FreeTextEntry3] : Catarina Gonzales

## 2023-06-26 NOTE — REASON FOR VISIT
[Initial Consultation] : an initial consultation for [FreeTextEntry2] : Coagulopathy, elevated PTT, factor VIII deficiency, B12 deficiency

## 2023-06-26 NOTE — ASSESSMENT
[FreeTextEntry1] : Patient is a 68 year old male with a history of Crohn's disease, Hiatal hernia and benign prostate enlargement who was referred for evaluation of coagulopathy prior to prostates surgery.  On initial work-up with Dr. Kaity Colon, the patient was found to have an elevated PTT and a low factor VIII level of 24%.  There is no family history of genetic bleeding diatheses, hemophilia or otherwise.  It is possible that patient has acquired factor VIII inhibitor based on his underlying autoimmune disorder (Crohn's colitis) or other antibody/inhibitor.  \par Have ordered Activated partial Thromboplastin Time, B12 and Folate, CBC with differential, CMP, Diluted Shankar's Viper Venom Time, Factor VIII Assay, Ferritin, Hexagonal Phase Lupus assay, iron panel, manual differential, miscellaneous test name ( [place] units), prothrombin time and INR, reticulocyte count and sed rate. Venipuncture was performed in the office today.  Patient had his daughter on speaker phone and the above was discussed with his daughter and all questions were answered.  Patient's daughter was advised to call the office to discuss results and further recommendations.

## 2023-06-26 NOTE — HISTORY OF PRESENT ILLNESS
[de-identified] : Patient is a 68 year old male with a history of Chrohn's disease, bladder calculi ,Hiatal hernia, COVID-19 infection in 2020 and benign prostate enlargement who was referred for evaluation of coagulopathy prior to prostates surgery. The patient consulted with hematologist Dr. Kaity Colon (since retired) for an elevated PTT level of 39.9. Evaluation at that time revealed a Factor VIII level that was low at 24%. The CBC was completely normal with a hemoglobin of 14.0 and hematocrit of 40.9. Von Willebrand Factor antigen, activity , and multimer structure were all normal. The patient is of Ashkenazi Ep[employer] descent and denies family history of hemophilia or any bleed disorders. The patient denies history of excessive bleeding and has  had procedures, upper and low endoscopy and dental procedures as well as surgery on his jaw in the distant past without excessive or unusual bleeding. Patient presently denies bleeding from nose or mouth. There is no blood in his urine or stool. He does not bleed into joints and does not bruise easily. Of note patient has a low B12 level of 280 and has been taking B12 supplements since May. The patient complains of  diarrhea, abdominal bloating and gas pains which occur with overeating and is attributed to his Crohn's colitis.. Denies fever, chills, chest pain, or shortness of breath.

## 2023-06-27 ENCOUNTER — LABORATORY RESULT (OUTPATIENT)
Age: 68
End: 2023-06-27

## 2023-06-27 LAB
ALBUMIN SERPL ELPH-MCNC: 4.6 G/DL
ALP BLD-CCNC: 131 U/L
ALT SERPL-CCNC: 13 U/L
ANION GAP SERPL CALC-SCNC: 15 MMOL/L
APTT BLD: 40.1 SEC
AST SERPL-CCNC: 19 U/L
BASOPHILS # BLD AUTO: 0 K/UL
BASOPHILS NFR BLD AUTO: 0 %
BILIRUB SERPL-MCNC: 1 MG/DL
BUN SERPL-MCNC: 13 MG/DL
BURR CELLS BLD QL SMEAR: PRESENT
CALCIUM SERPL-MCNC: 9.6 MG/DL
CHLORIDE SERPL-SCNC: 99 MMOL/L
CO2 SERPL-SCNC: 23 MMOL/L
CREAT SERPL-MCNC: 1.02 MG/DL
EGFR: 81 ML/MIN/1.73M2
EOSINOPHIL # BLD AUTO: 0.45 K/UL
EOSINOPHIL NFR BLD AUTO: 5.2 %
ERYTHROCYTE [SEDIMENTATION RATE] IN BLOOD BY WESTERGREN METHOD: 19 MM/HR
FACT VIII ACT/NOR PPP: 40 %
FERRITIN SERPL-MCNC: 65 NG/ML
FOLATE SERPL-MCNC: 6.1 NG/ML
GLUCOSE SERPL-MCNC: 94 MG/DL
INR PPP: 1.03
IRON SATN MFR SERPL: 15 %
IRON SERPL-MCNC: 55 UG/DL
LYMPHOCYTES # BLD AUTO: 2.46 K/UL
LYMPHOCYTES NFR BLD AUTO: 28.7 %
MONOCYTES # BLD AUTO: 0.59 K/UL
MONOCYTES NFR BLD AUTO: 6.9 %
MSMEAR: NORMAL
NEUTROPHILS # BLD AUTO: 5 K/UL
NEUTROPHILS NFR BLD AUTO: 57.4 %
NEUTS BAND NFR BLD MANUAL: 0.9 %
OVALOCYTES BLD QL SMEAR: SLIGHT
PLAT MORPH BLD: NORMAL
POIKILOCYTOSIS BLD QL SMEAR: SLIGHT
POTASSIUM SERPL-SCNC: 3.5 MMOL/L
PROT SERPL-MCNC: 7.5 G/DL
PT BLD: 12.3 SEC
RBC # BLD: 4.79 M/UL
RBC BLD AUTO: ABNORMAL
RETICS # AUTO: 1.1 %
RETICS AGGREG/RBC NFR: 52.7 K/UL
SODIUM SERPL-SCNC: 137 MMOL/L
TIBC SERPL-MCNC: 363 UG/DL
UIBC SERPL-MCNC: 307 UG/DL
VARIANT LYMPHS # BLD MANUAL: 0.9 %
VIT B12 SERPL-MCNC: 1088 PG/ML

## 2023-06-30 LAB
APTT HEX PL PPP: NEGATIVE
CONFIRM: 30.4 SEC
DRVVT IMM 1:2 NP PPP: NORMAL
DRVVT SCREEN TO CONFIRM RATIO: 0.82 RATIO
SCREEN DRVVT: 24.8 SEC

## 2023-07-03 LAB
CARDIOLIPIN AB SER IA-ACNC: NEGATIVE
VWF AG PPP IA-ACNC: 137 %
VWF:RCO ACT/NOR PPP PL AGG: 100 %

## 2023-07-07 RX ORDER — OLMESARTAN MEDOXOMIL 20 MG/1
20 TABLET, FILM COATED ORAL DAILY
Qty: 30 | Refills: 5 | Status: ACTIVE | COMMUNITY
Start: 2023-03-23 | End: 1900-01-01

## 2023-07-11 LAB
B2 GLYCOPROT1 IGA SERPL IA-ACNC: 5.3 SAU
B2 GLYCOPROT1 IGG SER-ACNC: <5 SGU
B2 GLYCOPROT1 IGM SER-ACNC: <5 SMU
CARDIOLIPIN IGM SER-MCNC: 9.7 MPL
CARDIOLIPIN IGM SER-MCNC: <5 GPL
DEPRECATED CARDIOLIPIN IGA SER: <5 APL

## 2023-07-12 ENCOUNTER — APPOINTMENT (OUTPATIENT)
Dept: GASTROENTEROLOGY | Facility: CLINIC | Age: 68
End: 2023-07-12
Payer: MEDICARE

## 2023-07-12 VITALS
BODY MASS INDEX: 25.86 KG/M2 | HEIGHT: 75 IN | SYSTOLIC BLOOD PRESSURE: 138 MMHG | DIASTOLIC BLOOD PRESSURE: 78 MMHG | RESPIRATION RATE: 16 BRPM | TEMPERATURE: 97.4 F | WEIGHT: 208 LBS | HEART RATE: 61 BPM | OXYGEN SATURATION: 96 %

## 2023-07-12 DIAGNOSIS — K59.09 OTHER CONSTIPATION: ICD-10-CM

## 2023-07-12 PROCEDURE — 99204 OFFICE O/P NEW MOD 45 MIN: CPT

## 2023-07-12 NOTE — PHYSICAL EXAM
[Alert] : alert [Sclera] : the sclera and conjunctiva were normal [Hearing Threshold Finger Rub Not [place]] : hearing was normal [Normal Appearance] : the appearance of the neck was normal [No Respiratory Distress] : no respiratory distress [Heart Rate And Rhythm] : heart rate was normal and rhythm regular [Abdomen Tenderness] : non-tender [No Masses] : no abdominal mass palpated [Abdomen Soft] : soft [Oriented To Time, Place, And Person] : oriented to person, place, and time

## 2023-07-13 ENCOUNTER — NON-APPOINTMENT (OUTPATIENT)
Age: 68
End: 2023-07-13

## 2023-07-13 LAB — VWF MULTIMERS PPP IA-ACNC: NORMAL

## 2023-07-14 ENCOUNTER — OUTPATIENT (OUTPATIENT)
Dept: OUTPATIENT SERVICES | Age: 68
LOS: 1 days | End: 2023-07-14

## 2023-07-14 DIAGNOSIS — S02.609A FRACTURE OF MANDIBLE, UNSPECIFIED, INITIAL ENCOUNTER FOR CLOSED FRACTURE: Chronic | ICD-10-CM

## 2023-07-17 ENCOUNTER — OUTPATIENT (OUTPATIENT)
Dept: OUTPATIENT SERVICES | Age: 68
LOS: 1 days | End: 2023-07-17

## 2023-07-17 ENCOUNTER — APPOINTMENT (OUTPATIENT)
Dept: HEMOPHILIA TREATMENT | Facility: HOSPITAL | Age: 68
End: 2023-07-17

## 2023-07-17 VITALS
RESPIRATION RATE: 16 BRPM | WEIGHT: 207.23 LBS | BODY MASS INDEX: 25.9 KG/M2 | TEMPERATURE: 97.8 F | HEART RATE: 62 BPM | SYSTOLIC BLOOD PRESSURE: 136 MMHG | DIASTOLIC BLOOD PRESSURE: 87 MMHG

## 2023-07-17 DIAGNOSIS — D66 HEREDITARY FACTOR VIII DEFICIENCY: ICD-10-CM

## 2023-07-17 DIAGNOSIS — K40.90 UNILATERAL INGUINAL HERNIA, W/OUT OBSTRUCTION OR GANGRENE, NOT SPECIFIED AS RECURRENT: ICD-10-CM

## 2023-07-17 DIAGNOSIS — R79.1 ABNORMAL COAGULATION PROFILE: ICD-10-CM

## 2023-07-17 DIAGNOSIS — S02.609A FRACTURE OF MANDIBLE, UNSPECIFIED, INITIAL ENCOUNTER FOR CLOSED FRACTURE: Chronic | ICD-10-CM

## 2023-07-17 LAB
APTT BLD: 38.3 SEC
FIBRINOGEN PPP-MCNC: 345 MG/DL
INR PPP: 1.14 RATIO
PT BLD: 13.3 SEC
TT CONT PPP: 21.8 SEC

## 2023-07-17 NOTE — CONSULT LETTER
[Dear  ___] : Dear  [unfilled], [Consult Letter:] : I had the pleasure of evaluating your patient, [unfilled]. [Please see my note below.] : Please see my note below. [Consult Closing:] : Thank you very much for allowing me to participate in the care of this patient.  If you have any questions, please do not hesitate to contact me. [Sincerely,] : Sincerely, [FreeTextEntry3] : Cyrus Cronin MD\par Professor of Medicine\par Chief of GI\par Director IBD Program\par [facility]\par

## 2023-07-17 NOTE — HISTORY OF PRESENT ILLNESS
[FreeTextEntry1] : 69 yo M with pmh htn, bph, bladder stones, factor 8 deficiency who had expericend 1 year severe abdominal pain and wt loss (60/70 lb) with referred by Dr. Ni for further management of IBD/ evaluation of current symptoms.\par \par Longstanding hx of "stomach issues" however issues have gotten worse over the past year.\par \par Lost >60-70lbs in last 6mo due to food avoidance because of prandial pain. Eats bits and pieces of food or liquid diet. Experiences bandlike pain around lower abdomen with eating. Symptoms associated with constipation, requiring manual disimpaction a few times a week. Normally has 1-2 bm/ day with relief of pain afterwards. Additionally, has inguinal hernia that enlarges with constipation and causes pain, relieved with defecation. Denies any blood in stool\par \par EGD/ Colonoscopy April 2022: \par - EGD: normal esophagus, gastritis, normal duodenum\par - Colonoscopy: "localized discontinuous hard consistency, irregularity and nodularity with no bleeding noted in the transverse colon. Mult bx obtained. Path with chronic inflammation, no dysplasia seen\par CT CAP 4/11/22: negative- no masses or inflammation of bowel, normal pancreas\par MRI Prostate: unremarkable\par \par Diagnosed with possible Crohns disease in Apr 2022- Dr. Sebastián Ni\par Started Mesalamine April 2022 and helps pain somewhat\par Omeprazole improves UGI reflux.\par \par Denies f/c/n/v, sob, jaundice, skin rashes, hematochezia or melena.\par \par PMH: Elevated PSA, BPH, HTN, EPI\par PSH: Jaw\par Allergies: None\par Medications: amlodipine 10mg, mesalamine twice daily, tamsulosin,omeprazole\par OTC/ supplements: Tylenol 1-2x/ day  BID, b12\par FHx: parents with "stomach problems"\par ETOH: never drinker\par Tobacco: never smoker\par Drug use: hx Opioid use, previously on methadone\par Previously screened for HBV and HCV

## 2023-07-17 NOTE — ASSESSMENT
[FreeTextEntry1] : 69 yo M with pmh htn, bph, bladder stones, factor 8 deficiency who had experienced 1 year severe abdominal pain and wt loss (60/70 lb) with referred by Dr. Ni for second opinon. Communicated with pt in person as well daughter on the phone. \par \par #Abdominal Pain\par #Wt loss\par #Constipation\par Patient with > 1 year worsening post prandial abdominal pain and weight loss. As part of his work up, he underwent EGD/ colonoscopy significant for abnormal finding in TC with fibrosis and hard consistency, nodularity with bx suggestive of a firm lesion.  However bx found fibrosis and chronic inflammation.  CT CAP and MRI pelvis were unremarkable. CBC, CMP, B12 grossly unremarkable. He was started on mesalamine with some improvement in symptoms.\par EGD: normal esophagus, gastritis, normal duodenum\par Colonoscopy: "localized discontinuous hard consistency, irregularity and nodularity with no bleeding noted in the transverse colon. Mult bx obtained. Path with chronic inflammation, no dysplasia seen\par \par At this point, DDX includes NSAIDs, Crohn's, scleroderma/mixed connective tissue dz, less likely malignancy\par \par - Patient with constipation requiring manual disimpaction. Recommend daily bowel regimen. Will start [employer] (daughter is in the company  and prefers to provide him with samples)\par - Repeat Colonoscopy to be scheduled at [facility] with 2 day Golyely prep to evaluate colonic mucosa and TI for IBD. R/B/I/A discussed and pt agrees. \par - Abd XR to assess stool burden\par - CT Enterography to evaluate for small bowel inflammation (daughter is concerned about pancreatic cancer)\par - Recommend avoiding NSAIDS if possible, consider injections for knees to minimize NSAID requirement\par \par \par -Referral to hematology for optimization of fac 8 def.\par \par F/u post procedure\par \par Kristie Del Cid MD\par GI Fellow\par \par

## 2023-07-17 NOTE — PHYSICAL EXAM
[] : decreased range of motion [Normal] : no cervical lymphadenopathy [Normal] : awake and interactive, normal strength tone throughout.

## 2023-07-18 ENCOUNTER — APPOINTMENT (OUTPATIENT)
Dept: HEMOPHILIA TREATMENT | Facility: HOSPITAL | Age: 68
End: 2023-07-18

## 2023-07-18 ENCOUNTER — OUTPATIENT (OUTPATIENT)
Dept: OUTPATIENT SERVICES | Age: 68
LOS: 1 days | End: 2023-07-18

## 2023-07-18 DIAGNOSIS — D66 HEREDITARY FACTOR VIII DEFICIENCY: ICD-10-CM

## 2023-07-18 DIAGNOSIS — S02.609A FRACTURE OF MANDIBLE, UNSPECIFIED, INITIAL ENCOUNTER FOR CLOSED FRACTURE: Chronic | ICD-10-CM

## 2023-07-18 LAB
APTT 2H P INC PPP: 36.7 SEC
APTT IMM NP/PRE NP PPP: 34.2 SEC
FACT INHIB XXX PPP-ACNC: NORMAL BU
FACT VIII ACT/NOR PPP: 112.8 %
FACT VIII ACT/NOR PPP: 36.7 %
FACTOR TESTED: NORMAL
NPP NORMAL POOLED PLASMA: 31.1 SEC

## 2023-07-20 ENCOUNTER — OUTPATIENT (OUTPATIENT)
Dept: OUTPATIENT SERVICES | Facility: HOSPITAL | Age: 68
LOS: 1 days | End: 2023-07-20

## 2023-07-20 ENCOUNTER — NON-APPOINTMENT (OUTPATIENT)
Age: 68
End: 2023-07-20

## 2023-07-20 ENCOUNTER — APPOINTMENT (OUTPATIENT)
Dept: CT IMAGING | Facility: CLINIC | Age: 68
End: 2023-07-20
Payer: MEDICARE

## 2023-07-20 ENCOUNTER — INPATIENT (INPATIENT)
Facility: HOSPITAL | Age: 68
LOS: 0 days | Discharge: HOME | DRG: 391 | End: 2023-07-21
Attending: SURGERY | Admitting: SURGERY
Payer: COMMERCIAL

## 2023-07-20 VITALS
RESPIRATION RATE: 18 BRPM | TEMPERATURE: 98 F | HEART RATE: 59 BPM | OXYGEN SATURATION: 98 % | DIASTOLIC BLOOD PRESSURE: 79 MMHG | WEIGHT: 205.91 LBS | HEIGHT: 74 IN | SYSTOLIC BLOOD PRESSURE: 142 MMHG

## 2023-07-20 DIAGNOSIS — S02.609A FRACTURE OF MANDIBLE, UNSPECIFIED, INITIAL ENCOUNTER FOR CLOSED FRACTURE: Chronic | ICD-10-CM

## 2023-07-20 LAB
ALBUMIN SERPL ELPH-MCNC: 4 G/DL — SIGNIFICANT CHANGE UP (ref 3.3–5)
ALP SERPL-CCNC: 126 U/L — HIGH (ref 40–120)
ALT FLD-CCNC: 9 U/L — LOW (ref 10–45)
ANION GAP SERPL CALC-SCNC: 11 MMOL/L — SIGNIFICANT CHANGE UP (ref 5–17)
APPEARANCE UR: CLEAR — SIGNIFICANT CHANGE UP
APTT BLD: 40.3 SEC — HIGH (ref 27.5–35.5)
AST SERPL-CCNC: 13 U/L — SIGNIFICANT CHANGE UP (ref 10–40)
BACTERIA # UR AUTO: PRESENT /HPF
BASOPHILS # BLD AUTO: 0.05 K/UL — SIGNIFICANT CHANGE UP (ref 0–0.2)
BASOPHILS NFR BLD AUTO: 0.8 % — SIGNIFICANT CHANGE UP (ref 0–2)
BILIRUB SERPL-MCNC: 1.2 MG/DL — SIGNIFICANT CHANGE UP (ref 0.2–1.2)
BILIRUB UR-MCNC: NEGATIVE — SIGNIFICANT CHANGE UP
BLD GP AB SCN SERPL QL: NEGATIVE — SIGNIFICANT CHANGE UP
BUN SERPL-MCNC: 12 MG/DL — SIGNIFICANT CHANGE UP (ref 7–23)
CALCIUM SERPL-MCNC: 9.4 MG/DL — SIGNIFICANT CHANGE UP (ref 8.4–10.5)
CHLORIDE SERPL-SCNC: 102 MMOL/L — SIGNIFICANT CHANGE UP (ref 96–108)
CO2 SERPL-SCNC: 24 MMOL/L — SIGNIFICANT CHANGE UP (ref 22–31)
COLOR SPEC: YELLOW — SIGNIFICANT CHANGE UP
CREAT SERPL-MCNC: 0.91 MG/DL — SIGNIFICANT CHANGE UP (ref 0.5–1.3)
DIFF PNL FLD: ABNORMAL
EGFR: 92 ML/MIN/1.73M2 — SIGNIFICANT CHANGE UP
EOSINOPHIL # BLD AUTO: 0.21 K/UL — SIGNIFICANT CHANGE UP (ref 0–0.5)
EOSINOPHIL NFR BLD AUTO: 3.2 % — SIGNIFICANT CHANGE UP (ref 0–6)
EPI CELLS # UR: SIGNIFICANT CHANGE UP /HPF (ref 0–5)
FACT INHIB XXX PPP-ACNC: NORMAL BU
FACT VIII ACT/NOR PPP: 49.4 %
FACTOR TESTED: NORMAL
GLUCOSE SERPL-MCNC: 92 MG/DL — SIGNIFICANT CHANGE UP (ref 70–99)
GLUCOSE UR QL: NEGATIVE — SIGNIFICANT CHANGE UP
HCT VFR BLD CALC: 39.5 % — SIGNIFICANT CHANGE UP (ref 39–50)
HGB BLD-MCNC: 14 G/DL — SIGNIFICANT CHANGE UP (ref 13–17)
IMM GRANULOCYTES NFR BLD AUTO: 0.5 % — SIGNIFICANT CHANGE UP (ref 0–0.9)
INR BLD: 1.1 — SIGNIFICANT CHANGE UP (ref 0.88–1.16)
KETONES UR-MCNC: NEGATIVE — SIGNIFICANT CHANGE UP
LEUKOCYTE ESTERASE UR-ACNC: ABNORMAL
LYMPHOCYTES # BLD AUTO: 1.83 K/UL — SIGNIFICANT CHANGE UP (ref 1–3.3)
LYMPHOCYTES # BLD AUTO: 27.7 % — SIGNIFICANT CHANGE UP (ref 13–44)
MCHC RBC-ENTMCNC: 30.1 PG — SIGNIFICANT CHANGE UP (ref 27–34)
MCHC RBC-ENTMCNC: 35.4 GM/DL — SIGNIFICANT CHANGE UP (ref 32–36)
MCV RBC AUTO: 84.9 FL — SIGNIFICANT CHANGE UP (ref 80–100)
MONOCYTES # BLD AUTO: 0.67 K/UL — SIGNIFICANT CHANGE UP (ref 0–0.9)
MONOCYTES NFR BLD AUTO: 10.1 % — SIGNIFICANT CHANGE UP (ref 2–14)
NEUTROPHILS # BLD AUTO: 3.82 K/UL — SIGNIFICANT CHANGE UP (ref 1.8–7.4)
NEUTROPHILS NFR BLD AUTO: 57.7 % — SIGNIFICANT CHANGE UP (ref 43–77)
NITRITE UR-MCNC: POSITIVE
NRBC # BLD: 0 /100 WBCS — SIGNIFICANT CHANGE UP (ref 0–0)
PH UR: 6.5 — SIGNIFICANT CHANGE UP (ref 5–8)
PLATELET # BLD AUTO: 263 K/UL — SIGNIFICANT CHANGE UP (ref 150–400)
POTASSIUM SERPL-MCNC: 3.3 MMOL/L — LOW (ref 3.5–5.3)
POTASSIUM SERPL-SCNC: 3.3 MMOL/L — LOW (ref 3.5–5.3)
PROT SERPL-MCNC: 7.2 G/DL — SIGNIFICANT CHANGE UP (ref 6–8.3)
PROT UR-MCNC: ABNORMAL MG/DL
PROTHROM AB SERPL-ACNC: 13.1 SEC — SIGNIFICANT CHANGE UP (ref 10.5–13.4)
RBC # BLD: 4.65 M/UL — SIGNIFICANT CHANGE UP (ref 4.2–5.8)
RBC # FLD: 12.4 % — SIGNIFICANT CHANGE UP (ref 10.3–14.5)
RBC CASTS # UR COMP ASSIST: < 5 /HPF — SIGNIFICANT CHANGE UP
RH IG SCN BLD-IMP: POSITIVE — SIGNIFICANT CHANGE UP
SODIUM SERPL-SCNC: 137 MMOL/L — SIGNIFICANT CHANGE UP (ref 135–145)
SP GR SPEC: 1.01 — SIGNIFICANT CHANGE UP (ref 1–1.03)
UROBILINOGEN FLD QL: 1 E.U./DL — SIGNIFICANT CHANGE UP
WBC # BLD: 6.61 K/UL — SIGNIFICANT CHANGE UP (ref 3.8–10.5)
WBC # FLD AUTO: 6.61 K/UL — SIGNIFICANT CHANGE UP (ref 3.8–10.5)
WBC UR QL: ABNORMAL /HPF

## 2023-07-20 PROCEDURE — 99285 EMERGENCY DEPT VISIT HI MDM: CPT | Mod: FS

## 2023-07-20 PROCEDURE — 74177 CT ABD & PELVIS W/CONTRAST: CPT | Mod: 26,MH

## 2023-07-20 RX ORDER — METRONIDAZOLE 500 MG
500 TABLET ORAL ONCE
Refills: 0 | Status: COMPLETED | OUTPATIENT
Start: 2023-07-20 | End: 2023-07-20

## 2023-07-20 RX ORDER — METRONIDAZOLE 500 MG
500 TABLET ORAL EVERY 8 HOURS
Refills: 0 | Status: DISCONTINUED | OUTPATIENT
Start: 2023-07-21 | End: 2023-07-21

## 2023-07-20 RX ORDER — ACETAMINOPHEN 500 MG
1000 TABLET ORAL ONCE
Refills: 0 | Status: DISCONTINUED | OUTPATIENT
Start: 2023-07-20 | End: 2023-07-21

## 2023-07-20 RX ORDER — TAMSULOSIN HYDROCHLORIDE 0.4 MG/1
0.4 CAPSULE ORAL AT BEDTIME
Refills: 0 | Status: DISCONTINUED | OUTPATIENT
Start: 2023-07-20 | End: 2023-07-21

## 2023-07-20 RX ORDER — CIPROFLOXACIN LACTATE 400MG/40ML
400 VIAL (ML) INTRAVENOUS ONCE
Refills: 0 | Status: COMPLETED | OUTPATIENT
Start: 2023-07-20 | End: 2023-07-20

## 2023-07-20 RX ORDER — DIPHENHYDRAMINE HCL 50 MG
25 CAPSULE ORAL ONCE
Refills: 0 | Status: DISCONTINUED | OUTPATIENT
Start: 2023-07-20 | End: 2023-07-21

## 2023-07-20 RX ORDER — ONDANSETRON 8 MG/1
4 TABLET, FILM COATED ORAL EVERY 4 HOURS
Refills: 0 | Status: DISCONTINUED | OUTPATIENT
Start: 2023-07-20 | End: 2023-07-21

## 2023-07-20 RX ORDER — HEPARIN SODIUM 5000 [USP'U]/ML
5000 INJECTION INTRAVENOUS; SUBCUTANEOUS EVERY 8 HOURS
Refills: 0 | Status: DISCONTINUED | OUTPATIENT
Start: 2023-07-20 | End: 2023-07-21

## 2023-07-20 RX ORDER — AMLODIPINE BESYLATE 2.5 MG/1
10 TABLET ORAL DAILY
Refills: 0 | Status: DISCONTINUED | OUTPATIENT
Start: 2023-07-20 | End: 2023-07-21

## 2023-07-20 RX ORDER — PANTOPRAZOLE SODIUM 20 MG/1
40 TABLET, DELAYED RELEASE ORAL DAILY
Refills: 0 | Status: DISCONTINUED | OUTPATIENT
Start: 2023-07-20 | End: 2023-07-21

## 2023-07-20 RX ORDER — CEFTRIAXONE 500 MG/1
1000 INJECTION, POWDER, FOR SOLUTION INTRAMUSCULAR; INTRAVENOUS EVERY 24 HOURS
Refills: 0 | Status: DISCONTINUED | OUTPATIENT
Start: 2023-07-21 | End: 2023-07-21

## 2023-07-20 RX ADMIN — Medication 100 MILLIGRAM(S): at 23:00

## 2023-07-20 RX ADMIN — HEPARIN SODIUM 5000 UNIT(S): 5000 INJECTION INTRAVENOUS; SUBCUTANEOUS at 22:12

## 2023-07-20 RX ADMIN — PANTOPRAZOLE SODIUM 40 MILLIGRAM(S): 20 TABLET, DELAYED RELEASE ORAL at 22:13

## 2023-07-20 NOTE — ED ADULT NURSE NOTE - NSFALLUNIVINTERV_ED_ALL_ED
Bed/Stretcher in lowest position, wheels locked, appropriate side rails in place/Call bell, personal items and telephone in reach/Instruct patient to call for assistance before getting out of bed/chair/stretcher/Non-slip footwear applied when patient is off stretcher/[place] to call system/Physically safe environment - no spills, clutter or unnecessary equipment/Purposeful proactive rounding/Room/bathroom lighting operational, light cord in reach

## 2023-07-20 NOTE — H&P ADULT - ASSESSMENT
Assessment:  69yo Male pt with PMHx HTN, BPH, known L. inguinal hernia, chronic constipation, currently being worked up for Crohn's disease, no pertinent PSHx, presented to ED after being found to have acute uncomplicated appendicitis on outpatient CT enterography. Patient does not appear to have acute appendicitis clinically, despite radiographic findings. From history and physical, it seems likely that pt suffers from chronic intermittent obstruction 2/2 L. inguinal hernia, however, patient needs to optimize his urologic status prior to repair.    Plan:  - admit overnight  - Reg diet  - ceftriaxone/flagyl  - GI recs  - pain/nausea control prn  - SQH, SCDs  - am labs  - home meds as appropriate  Plan discussed with chief resident and attending surgeon, Dr. Eric Reyes personally saw and examined patient at the bedside this evening. Agrees low suspicion for acute appendicitis, patient w/ L. reducible inguinal hernia, however requires urologic procedures for BPH prior to elective hernia repair. Will appreciate GI input regarding his current workup for Crohn's disease.

## 2023-07-20 NOTE — H&P ADULT - NSHPLABSRESULTS_GEN_ALL_CORE
LABS:                        14.0   6.61  )-----------( 263      ( 20 Jul 2023 20:26 )             39.5     07-20    137  |  102  |  12  ----------------------------<  92  3.3<L>   |  24  |  0.91    Ca    9.4      20 Jul 2023 20:26    TPro  7.2  /  Alb  4.0  /  TBili  1.2  /  DBili  x   /  AST  13  /  ALT  9<L>  /  AlkPhos  126<H>  07-20    PT/INR - ( 20 Jul 2023 20:26 )   PT: 13.1 sec;   INR: 1.10          PTT - ( 20 Jul 2023 20:26 )  PTT:40.3 sec

## 2023-07-20 NOTE — H&P ADULT - NSHPPHYSICALEXAM_GEN_ALL_CORE
T(C): 36.5 (07-20-23 @ 18:56), Max: 36.5 (07-20-23 @ 18:56)  HR: 59 (07-20-23 @ 18:56) (59 - 59)  BP: 142/79 (07-20-23 @ 18:56) (142/79 - 142/79)  RR: 18 (07-20-23 @ 18:56) (18 - 18)  SpO2: 98% (07-20-23 @ 18:56) (98% - 98%)    Physical Exam  General: AAOx3, NAD, laying comfortably in bed  Cardio: S1,S2, No MRG  Pulm: Nonlabored breathing  Abdomen: On exam, soft, NTND. No rebound or guarding. L. inguinal hernia reducible, mild ttp, no overlying skin changes.   Extremities: WWP, peripheral pulses appreciated

## 2023-07-20 NOTE — ED PROVIDER NOTE - NSICDXPASTMEDICALHX_GEN_ALL_CORE_FT
PAST MEDICAL HISTORY:  BPH (benign prostatic hyperplasia)     HTN (hypertension)     Left inguinal hernia

## 2023-07-20 NOTE — ED PROVIDER NOTE - PHYSICAL EXAMINATION
CONST: nontoxic NAD speaking in full sentences  HEAD: atraumatic  EYES: conjunctivae clear, PERRL, EOMI  ENT: mmm  NECK: supple/FROM, nttp  CARD: rrr   CHEST: ctab no r/r/w, no stridor/retractions/tripoding  ABD: soft, nd, nttp, no rebound/guarding, palpable but non-tender left inguinal hernia  EXT: FROM, symmetric distal pulses intact  SKIN: warm, dry, no rash, no pedal edema/ttp/rash, cap refill <2sec  NEURO: awake alert answering questions following commands moving all extremities

## 2023-07-20 NOTE — ED ADULT NURSE REASSESSMENT NOTE - NS ED NURSE REASSESS COMMENT FT1
Notified Inpatient team of patient's reaction to Cipro antibiotic. Was endorsed that continuing order for Cipro will be discontinued. Endorse to Ed holding nurse.

## 2023-07-20 NOTE — ED PROVIDER NOTE - OBJECTIVE STATEMENT
69 y/o male with a PMHx of HTN, BPH, Crones Disease, and left inguinal hernia presents to the ED s/p an outpatient abdominal CT scan earlier today with findings consistent for acute appendicitis. Pt reports that he has been having abdominal pain for the past year that he usually experienced as defused, lower abdominal pain. Pt also mentions that his hernia sometimes protrudes and causes a lot of intermittent pain but the pt can occasionally reduce it himself. Pt reports losing over 35 pounds in the past year due to changed bowel movements and diarrhea for the past few months. Pt was seen by a Edna's specialist to r/o possible Edna's and get started on Mesalamine. Pt reports that his Edna's specialist order his CT scan today due to his recent symptoms and the radiologist called with a recommendation to immediately go to the ER. Pt denies any fever, vomiting, dysuria, hematuria, or abdominal pain at the moment.

## 2023-07-20 NOTE — ED PROVIDER NOTE - CLINICAL SUMMARY MEDICAL DECISION MAKING FREE TEXT BOX
69 y/o male with a PMHx of HTN, BPH, Crones Disease, and left inguinal hernia presents to the ED s/p an outpatient abdominal CT scan earlier today with findings consistent for acute appendicitis. Pt is well appearing in the ER, VSS, hemodynamically stable and denying any abdominal pain at the moment or worsening abdominal pain previous to the CT. Pt denies any vomiting or appetite changes and his labs were normal with no indications for leukocytosis or a UTI and with normal electrolytes. Pt was seen and evaluated bu the surgeon in the ER and admission with IV abx for serial abdominal exam was recommended.

## 2023-07-20 NOTE — H&P ADULT - HISTORY OF PRESENT ILLNESS
69yo Male pt with PMHx HTN, BPH, known L. inguinal hernia, chronic constipation, multiple hospitalizations for UTIs, no pertinent PSHx, presented to ED after being found to have acute uncomplicated appendicitis on outpatient CT enterography.     Patient reports that over the last 6-12 months, he has had decreased appetite 2/2 abdominal pain. He reports that he must empty his bowels every morning and is chronically constipated. A few times per month he needs to manually disimpact himself. Additionally, he has occasional episodes where he has crampy, diffuse abdominal pain, followed by 3 hours of diarrhea. Patient reports that "when his L. inguinal hernia is large, its hard for him to pass stool, but when it retracts it is easier," which sounds like intermittent obstruction. He additionally has had a 60 lb weight loss over the last 6 months 2/2 decreased ability to eat secondary to abdominal pain. He was subsequently worked up by outpatient GI for malignancy and Crohns, both of which have been unremarkable thus far. Patient was started on a trial of mesalamine with some improvement.     Of note, he has recently been referred for a TURP for his BPH, but was found to have factor 8 deficiency, now s/p tranfusions. He was supposed to perform the TURP prior to an elective repair of his L. inguinal hernia.     In the ED, VS wnl. On exam, soft, NTND. L. inguinal hernia reducible, mild ttp, no overlying skin changes. Labs significant for WBC 6, Hb 13, CTAP showing The appendix is abnormal. It is diffusely distended/edematous measuring up to 11 mm in diameter. This is new compared to the previous study. No evidence of perforation or complication. The terminal ileum is normal. No colonic wall thickening to suggest a colitis. Left inguinal hernia containing a nonobstructed loop of distal descending/proximal sigmoid colon. The hernia has increased in size since the previous study and previously it did not contain bowel. Posterior right bladder wall thickening w/ intravesicular layering calcifications with a small right posterior bladder diverticulum; although this may be due to chronic impaired bladder emptying.    PMH: HTN, BPH, possible Crohn's (not confirmed), L inguinal hernia, multiple recent hospitalizations for UTIs  PSHx: Jaw surgery (>30 yrs prior)  Medications:   Allergies: NKDA  Social Hx: nosmoker, denies alcohol use  Family Hx: Denies family hx of IBS, Crohn's, UC, or colon cancer.  Last colonoscopy: April 2022: localized discontinuous hard consistency, irregularity and nodularity with no bleeding noted in the transverse colon. Mult bx obtained. Path with chronic inflammation, no dysplasia seen  Last EGD: April 2022: normal esophagus, gastritis, normal duodenum  CT CAP 4/11/22: negative- no masses or inflammation of bowel, normal pancreas  MRI Prostate: unremarkable

## 2023-07-20 NOTE — ED ADULT NURSE NOTE - OBJECTIVE STATEMENT
68y Male A&ox3 TO ed c/o generalized abd pain. Pt reports diagnosis of Diverticulosis yesterday with MRI. Denies chest pain, fever, chills, n/v/d at this time

## 2023-07-21 ENCOUNTER — NON-APPOINTMENT (OUTPATIENT)
Age: 68
End: 2023-07-21

## 2023-07-21 ENCOUNTER — TRANSCRIPTION ENCOUNTER (OUTPATIENT)
Age: 68
End: 2023-07-21

## 2023-07-21 VITALS
SYSTOLIC BLOOD PRESSURE: 137 MMHG | OXYGEN SATURATION: 96 % | DIASTOLIC BLOOD PRESSURE: 76 MMHG | RESPIRATION RATE: 18 BRPM | TEMPERATURE: 98 F | HEART RATE: 74 BPM

## 2023-07-21 LAB
ANION GAP SERPL CALC-SCNC: 10 MMOL/L — SIGNIFICANT CHANGE UP (ref 5–17)
BUN SERPL-MCNC: 9 MG/DL — SIGNIFICANT CHANGE UP (ref 7–23)
CALCIUM SERPL-MCNC: 8.9 MG/DL — SIGNIFICANT CHANGE UP (ref 8.4–10.5)
CHLORIDE SERPL-SCNC: 103 MMOL/L — SIGNIFICANT CHANGE UP (ref 96–108)
CO2 SERPL-SCNC: 25 MMOL/L — SIGNIFICANT CHANGE UP (ref 22–31)
CREAT SERPL-MCNC: 0.91 MG/DL — SIGNIFICANT CHANGE UP (ref 0.5–1.3)
EGFR: 92 ML/MIN/1.73M2 — SIGNIFICANT CHANGE UP
GLUCOSE SERPL-MCNC: 91 MG/DL — SIGNIFICANT CHANGE UP (ref 70–99)
HCT VFR BLD CALC: 38.5 % — LOW (ref 39–50)
HCV AB S/CO SERPL IA: 0.04 S/CO — SIGNIFICANT CHANGE UP
HCV AB SERPL-IMP: SIGNIFICANT CHANGE UP
HGB BLD-MCNC: 13.7 G/DL — SIGNIFICANT CHANGE UP (ref 13–17)
MAGNESIUM SERPL-MCNC: 1.9 MG/DL — SIGNIFICANT CHANGE UP (ref 1.6–2.6)
MCHC RBC-ENTMCNC: 30 PG — SIGNIFICANT CHANGE UP (ref 27–34)
MCHC RBC-ENTMCNC: 35.6 GM/DL — SIGNIFICANT CHANGE UP (ref 32–36)
MCV RBC AUTO: 84.2 FL — SIGNIFICANT CHANGE UP (ref 80–100)
NRBC # BLD: 0 /100 WBCS — SIGNIFICANT CHANGE UP (ref 0–0)
PHOSPHATE SERPL-MCNC: 2.6 MG/DL — SIGNIFICANT CHANGE UP (ref 2.5–4.5)
PLATELET # BLD AUTO: 246 K/UL — SIGNIFICANT CHANGE UP (ref 150–400)
POTASSIUM SERPL-MCNC: 3.1 MMOL/L — LOW (ref 3.5–5.3)
POTASSIUM SERPL-SCNC: 3.1 MMOL/L — LOW (ref 3.5–5.3)
RBC # BLD: 4.57 M/UL — SIGNIFICANT CHANGE UP (ref 4.2–5.8)
RBC # FLD: 12.1 % — SIGNIFICANT CHANGE UP (ref 10.3–14.5)
SODIUM SERPL-SCNC: 138 MMOL/L — SIGNIFICANT CHANGE UP (ref 135–145)
WBC # BLD: 4.55 K/UL — SIGNIFICANT CHANGE UP (ref 3.8–10.5)
WBC # FLD AUTO: 4.55 K/UL — SIGNIFICANT CHANGE UP (ref 3.8–10.5)

## 2023-07-21 PROCEDURE — 80053 COMPREHEN METABOLIC PANEL: CPT

## 2023-07-21 PROCEDURE — 86901 BLOOD TYPING SEROLOGIC RH(D): CPT

## 2023-07-21 PROCEDURE — 81001 URINALYSIS AUTO W/SCOPE: CPT

## 2023-07-21 PROCEDURE — 83735 ASSAY OF MAGNESIUM: CPT

## 2023-07-21 PROCEDURE — 85610 PROTHROMBIN TIME: CPT

## 2023-07-21 PROCEDURE — 36000 PLACE NEEDLE IN VEIN: CPT

## 2023-07-21 PROCEDURE — 86850 RBC ANTIBODY SCREEN: CPT

## 2023-07-21 PROCEDURE — 87086 URINE CULTURE/COLONY COUNT: CPT

## 2023-07-21 PROCEDURE — 74177 CT ABD & PELVIS W/CONTRAST: CPT

## 2023-07-21 PROCEDURE — 85730 THROMBOPLASTIN TIME PARTIAL: CPT

## 2023-07-21 PROCEDURE — 85025 COMPLETE CBC W/AUTO DIFF WBC: CPT

## 2023-07-21 PROCEDURE — 86803 HEPATITIS C AB TEST: CPT

## 2023-07-21 PROCEDURE — 36415 COLL VENOUS BLD VENIPUNCTURE: CPT

## 2023-07-21 PROCEDURE — 80048 BASIC METABOLIC PNL TOTAL CA: CPT

## 2023-07-21 PROCEDURE — 84100 ASSAY OF PHOSPHORUS: CPT

## 2023-07-21 PROCEDURE — 85027 COMPLETE CBC AUTOMATED: CPT

## 2023-07-21 PROCEDURE — 99285 EMERGENCY DEPT VISIT HI MDM: CPT

## 2023-07-21 PROCEDURE — 86900 BLOOD TYPING SEROLOGIC ABO: CPT

## 2023-07-21 RX ORDER — METRONIDAZOLE 500 MG
1 TABLET ORAL
Qty: 9 | Refills: 0
Start: 2023-07-21 | End: 2023-07-23

## 2023-07-21 RX ORDER — CEFPODOXIME PROXETIL 100 MG
1 TABLET ORAL
Qty: 6 | Refills: 0
Start: 2023-07-21 | End: 2023-07-23

## 2023-07-21 RX ORDER — TRANEXAMIC ACID 650 MG/1
650 TABLET ORAL
Qty: 30 | Refills: 3 | Status: ACTIVE | COMMUNITY
Start: 2023-07-21 | End: 1900-01-01

## 2023-07-21 RX ADMIN — PANTOPRAZOLE SODIUM 40 MILLIGRAM(S): 20 TABLET, DELAYED RELEASE ORAL at 13:01

## 2023-07-21 RX ADMIN — HEPARIN SODIUM 5000 UNIT(S): 5000 INJECTION INTRAVENOUS; SUBCUTANEOUS at 05:50

## 2023-07-21 RX ADMIN — CEFTRIAXONE 100 MILLIGRAM(S): 500 INJECTION, POWDER, FOR SOLUTION INTRAMUSCULAR; INTRAVENOUS at 00:24

## 2023-07-21 RX ADMIN — Medication 100 MILLIGRAM(S): at 13:01

## 2023-07-21 RX ADMIN — Medication 100 MILLIGRAM(S): at 05:49

## 2023-07-21 RX ADMIN — HEPARIN SODIUM 5000 UNIT(S): 5000 INJECTION INTRAVENOUS; SUBCUTANEOUS at 13:01

## 2023-07-21 RX ADMIN — AMLODIPINE BESYLATE 10 MILLIGRAM(S): 2.5 TABLET ORAL at 05:49

## 2023-07-21 NOTE — PROGRESS NOTE ADULT - SUBJECTIVE AND OBJECTIVE BOX
Patient evaluated with chief resident during AM rounds    Overnight Events: No acute events overnight  SUBJECTIVE: Patient states that his abdominal pain is much improved this AM. Now with hernia reduced, he feels back to normal. Able to ambulate without issues. Tolerating diet, regular. +BM overnight/+F.  -N/-V.     Denies Chest Pain, Difficulty breathing, Calf Pain, Headache, Dizziness    OBJECTIVE:  Vital Signs Last 24 Hrs  T(C): 36.4 (21 Jul 2023 05:00), Max: 36.6 (20 Jul 2023 22:14)  T(F): 97.5 (21 Jul 2023 05:00), Max: 97.9 (20 Jul 2023 22:14)  HR: 70 (21 Jul 2023 05:00) (59 - 72)  BP: 124/81 (21 Jul 2023 05:00) (124/81 - 151/89)  BP(mean): 95 (21 Jul 2023 05:00) (95 - 95)  RR: 18 (21 Jul 2023 05:00) (17 - 19)  SpO2: 97% (21 Jul 2023 05:00) (97% - 100%)    Parameters below as of 21 Jul 2023 05:00  Patient On (Oxygen Delivery Method): room air        I&O's Summary    20 Jul 2023 07:01  -  21 Jul 2023 07:00  --------------------------------------------------------  IN: 0 mL / OUT: 100 mL / NET: -100 mL        Physical Exam:  General Appearance: Appears well, NAD  Pulmonary: Nonlabored breathing, no respiratory distress  Cardiovascular: NSR  Abdomen: Soft, nondistneded, non-tender. L inguinal hernia reduced, not appreciated on exam  Extremities: WWP, SCD's in place     LABS:                        13.7   4.55  )-----------( 246      ( 21 Jul 2023 07:11 )             38.5     07-21    138  |  103  |  9   ----------------------------<  91  3.1<L>   |  25  |  0.91    Ca    8.9      21 Jul 2023 07:11  Phos  2.6     07-21  Mg     1.9     07-21    TPro  7.2  /  Alb  4.0  /  TBili  1.2  /  DBili  x   /  AST  13  /  ALT  9<L>  /  AlkPhos  126<H>  07-20    PT/INR - ( 20 Jul 2023 20:26 )   PT: 13.1 sec;   INR: 1.10          PTT - ( 20 Jul 2023 20:26 )  PTT:40.3 sec  Urinalysis Basic - ( 21 Jul 2023 07:11 )    Color: x / Appearance: x / SG: x / pH: x  Gluc: 91 mg/dL / Ketone: x  / Bili: x / Urobili: x   Blood: x / Protein: x / Nitrite: x   Leuk Esterase: x / RBC: x / WBC x   Sq Epi: x / Non Sq Epi: x / Bacteria: x

## 2023-07-21 NOTE — DISCHARGE NOTE PROVIDER - NSDCMRMEDTOKEN_GEN_ALL_CORE_FT
acetaminophen 500 mg oral tablet: 1 tab(s) orally every 6 hours   amLODIPine 10 mg oral tablet: 1 tab(s) orally once a day  cefpodoxime 200 mg oral tablet: 1 tab(s) orally 2 times a day MDD: 2 tablets  famotidine 40 mg oral tablet: 1 tab(s) orally once a day (at bedtime)  ibuprofen 800 mg oral tablet: 1 tab(s) orally 3 times a day  mesalamine 400 mg oral delayed release capsule: 1 cap(s) orally 2 times a day   metroNIDAZOLE 500 mg oral tablet: 1 tab(s) orally every 8 hours MDD: 3 tablets  omeprazole 40 mg oral delayed release capsule: 1 cap(s) orally once a day  tamsulosin 0.4 mg oral capsule: 1 cap(s) orally once a day

## 2023-07-21 NOTE — DISCHARGE NOTE PROVIDER - NSDCFUSCHEDAPPT_GEN_ALL_CORE_FT
Cyrus Cronin  [facility]  GASTRO  [place] [address]  Scheduled Appointment: 08/29/2023    Stephen Guillen  [facility]  NEPHRO [address]  Scheduled Appointment: 09/12/2023

## 2023-07-21 NOTE — DISCHARGE NOTE PROVIDER - HOSPITAL COURSE
68yom patient with PMHx of hemophilia, BPH c/b recurrent UTIs, HTN, L inguinal hernia, chronic constipation and outpatient workup of crohn's disease with mesalamine treatment who presented to ED after outpatient CT A/P concerning for acute appendicitis. As well, patient's L hernia was recently reduced but had been causing discomfort and constipation per patient.     Surgical evaluation of patient was not concerning for acute appendicitis. Patient WBC within normal limits, without acute/roby peritonitis, afebrile. Clinical picture not suspicious.  Ceftriaxone/flagyl started, to be completed as a 4 day course PO.     Patient consult to GI completed with recommendations to encourage fluid intake for constipation and to take laxative.   Further outpatient workup by GI may be continued by patients personal GI that he has been seeing.   F/up with Dr. Reyes for 1 week planned.     On date of discharge, patient was hemodynamically stable, without abdominal pain, tolerating diet and passing BM.

## 2023-07-21 NOTE — DISCHARGE NOTE PROVIDER - NSDCFUADDINST_GEN_ALL_CORE_FT
Please complete your antibiotics as prescribed. You may pick them up from [facility] pharmacy on the first floor of the hospital.     Warning Signs:  Please call your doctor or nurse practitioner if you experience the following:  *You experience new chest pain, pressure, squeezing or tightness.  *New or worsening cough, shortness of breath, or wheeze.  *If you are vomiting and cannot keep down fluids or your medications.  *You are getting dehydrated due to continued vomiting, diarrhea, or other reasons. Signs of dehydration include dry mouth, rapid heartbeat, or feeling dizzy or faint when standing.  *You see blood or dark/black material when you vomit or have a bowel movement.  *You experience burning when you urinate, have blood in your urine, or experience a discharge.  *Your pain is not improving within 8-12 hours or is not gone within 24 hours. Call or return immediately if your pain is getting worse, changes location, or moves to your chest or back.  *You have shaking chills, or fever greater than 101.5 degrees Fahrenheit or 38 degrees Celsius.  *Any change in your symptoms, or any new symptoms that concern you.

## 2023-07-21 NOTE — PATIENT PROFILE ADULT - FALL HARM RISK - UNIVERSAL INTERVENTIONS
Bed in lowest position, wheels locked, appropriate side rails in place/Call bell, personal items and telephone in reach/Instruct patient to call for assistance before getting out of bed or chair/Non-slip footwear when patient is out of bed/[place] to call system/Physically safe environment - no spills, clutter or unnecessary equipment/Purposeful Proactive Rounding/Room/bathroom lighting operational, light cord in reach

## 2023-07-21 NOTE — DISCHARGE NOTE PROVIDER - CARE PROVIDER_API CALL
Roderick Reyes [facility]  [address] [place] [address], [address]  [place] [address]  Phone: [phone]  Fax: [phone]  Follow Up Time: 1 week    Cyrus Cronin  Gastroenterology  [address]  [place] [address]  Phone: [phone]  Fax: [phone]  Follow Up Time: 1 week   Roderick Reeys [facility]  Surgery  [address] [place] [address], [address]  [place] [address]  Phone: [phone]  Fax: [phone]  Follow Up Time: 1 week    Cyrus Cronin  Gastroenterology  [address], [address]  [place] [address]  Phone: [phone]  Fax: [phone]  Follow Up Time: 1 week    Favio Lopez  Urology  [address] [address]  Phone: [phone]  Fax: [phone]  Follow Up Time: 1 week

## 2023-07-21 NOTE — CONSULT NOTE ADULT - ASSESSMENT
67yo Male pt with PMHx HTN, BPH, known L. inguinal hernia, chronic constipation, multiple hospitalizations for UTIs, no pertinent PSHx, presented to ED after being found to have acute uncomplicated appendicitis on outpatient CT enterography. Patient also had reducible L inguinal hernia, reduced by surgery. Crohns disease being worked up by GI on an outpatient basis treated with mesalamine which can be continued. Clinically patients appendicitis does not warrant surgical intervention being treated medically with ceftriaxone / flagyl, patients UA is positive as well can complete 5 day course of cephalosporins awaiting elective TURP in light of his hemophilia requiring blood transfusions which can contribute to his recurrent UTIs

## 2023-07-21 NOTE — DISCHARGE NOTE PROVIDER - NSDCCPCAREPLAN_GEN_ALL_CORE_FT
PRINCIPAL DISCHARGE DIAGNOSIS  Diagnosis: Crohn's disease  Assessment and Plan of Treatment:       SECONDARY DISCHARGE DIAGNOSES  Diagnosis: Pain, abdominal  Assessment and Plan of Treatment:

## 2023-07-21 NOTE — DISCHARGE NOTE PROVIDER - CARE PROVIDERS DIRECT ADDRESSES
,DirectAddress_Unknown,[email or web address] ,DirectAddress_Unknown,[email or web address]

## 2023-07-21 NOTE — DISCHARGE NOTE PROVIDER - PROVIDER TOKENS
PROVIDER:[TOKEN:[[id number]],FOLLOWUP:[1 week]],PROVIDER:[TOKEN:[[id number]],FOLLOWUP:[1 week]] PROVIDER:[TOKEN:[[id number]],FOLLOWUP:[1 week]],PROVIDER:[TOKEN:[[id number]],FOLLOWUP:[1 week]],PROVIDER:[TOKEN:[[id number]],FOLLOWUP:[1 week]]

## 2023-07-21 NOTE — CONSULT NOTE ADULT - ASSESSMENT
68M h/o mild factor VIII deficiency, BPH c/b recurrent UTIs, L inguinal hernia, undergoing workup for ?Crohns referred to [facility] given appendicitis seen on CT enterography. GI is consulted in regards to his Crohns workup.    Workup for possible Crohns is being completed on an outpatient basis and he has no indications for hospitalization from that perspective. Regardless of whether he has Crohns or not it seems like his symptoms improve with treatment of his constipation, thus this would be a low-hanging fruit to go after. Will defer to surgery regarding whether he has appendicitis and thus overall management.    Recommendations:  - No indication for inpatient Crohns workup  - Encourage water intake and treatment of constipation, whether with Linzess or another laxative (eg. Miralax) that he is willing to try  - Antibiotics and dispo per primary team    We will sign off, but please page if any further questions arise. Please see attending addendum for final recommendations.     Abdelrahman (Ney) MD Joshua  Gastroenterology Fellow  Pager (M-F 7a-5p): [phone]  Pager (after hours): Please call  for on-call fellow   68M h/o mild factor VIII deficiency, BPH c/b recurrent UTIs, L inguinal hernia, undergoing workup for ?Crohns referred to [facility] given appendicitis seen on CT enterography. GI is consulted in regards to his Crohns workup.    Workup for possible Crohns is being completed on an outpatient basis and he has no indications for hospitalization from that perspective. Regardless of whether he has Crohns or not it seems like his symptoms improve with treatment of his constipation, thus this would be a low-hanging fruit to go after. Will defer to surgery regarding whether he has appendicitis and thus overall management.    Recommendations:  - No indication for inpatient Crohns workup. We will arrange for outpatient colonscopy and f/u as discussed with Dr. Cronin.  - Encourage water intake and treatment of constipation, whether with Linzess or another laxative (eg. Miralax) that he is willing to try  - Antibiotics and dispo per primary team    We will sign off, but please page if any further questions arise. Please see attending addendum for final recommendations.     Abdelrahman (Shahid Lewis MD  Gastroenterology Fellow  Pager (M-F 7a-5p): [phone]  Pager (after hours): Please call  for on-call fellow   68M h/o mild factor VIII deficiency, BPH c/b recurrent UTIs, L inguinal hernia, undergoing workup for ?Crohns referred to [facility] given appendicitis seen on CT enterography. GI is consulted in regards to his Crohns workup.    Workup for possible Crohns is being completed on an outpatient basis and he has no indications for hospitalization from that perspective. Regardless of whether he has Crohns or not it seems like his symptoms improve with treatment of his constipation, thus this would be a low-hanging fruit to go after. Will defer to surgery regarding whether he has appendicitis and thus overall management.    Recommendations:  - No indication for inpatient Crohns workup. We will arrange for outpatient colonscopy and f/u as discussed with Dr. Cronin.  - Encourage water intake and treatment of constipation, whether with Linzess or another laxative (eg. Miralax) that he is willing to try  - Antibiotics and dispo per primary team    We will sign off, but please page if any further questions arise. Patient was not seen by attending since he was discharged prior to being staffed.    Abdelrahman ([facility]) MD Joshua  Gastroenterology Fellow  Pager (M-F 7a-5p): [phone]  Pager (after hours): Please call  for on-call fellow

## 2023-07-21 NOTE — PATIENT PROFILE ADULT - TOBACCO USE
Notified Dr. Busby about elevated HR up to 130, He came by to bedside to evaluate patient and just wants to monitor closely for now. Patient woke up and was scared because she didn't know where she was.   Never smoker

## 2023-07-21 NOTE — PROGRESS NOTE ADULT - ASSESSMENT
68yom with PMHx HTN, BPH, and L Inguinal hernia with chronic constipation now being worked up outpatient for Crohn's disease presented to ED with abdominal pain history and CT evidence of uncomplicated appendicitis. Admitted for concern for acute appendicitis. Clinically without any significant RLQ abdominal pain, WBC count WNL, abdominal exam not consistent with acute appendicitis.     Plan:  -Reg Diet  -No acute surgical interventions at this time  -Ceftriaxone and Flagyl (To complete 4 day course)  -GI Consult  -Pain/Nausea PRN  -SQH/SCD  -Home Meds as apporpriate    Plan discussed with Dr. Reyes

## 2023-07-21 NOTE — CONSULT NOTE ADULT - SUBJECTIVE AND OBJECTIVE BOX
HPI: HPI:  69yo Male pt with PMHx HTN, BPH, known L. inguinal hernia, chronic constipation, multiple hospitalizations for UTIs, no pertinent PSHx, presented to ED after being found to have acute uncomplicated appendicitis on outpatient CT enterography. Patient also had reducible L inguinal hernia, reduced by surgery. Crohns disease being worked up by GI on an outpatient basis treated with mesalamine which can be continued. Clinically patients appendicitis does not warrant surgical intervention being treated medically with ceftriaxone / flagyl, patients UA is positive as well can complete 5 day course of cephalosporins awaiting elective TURP in light of his hemophilia requiring blood transfusions which can contribute to his recurrent UTIs        PMH: HTN, BPH, possible Crohn's (not confirmed), L inguinal hernia, multiple recent hospitalizations for UTIs  PSHx: Jaw surgery (>30 yrs prior)  Medications:   Allergies: NKDA  Social Hx: nosmoker, denies alcohol use  Family Hx: Denies family hx of IBS, Crohn's, UC, or colon cancer.  Last colonoscopy: April 2022: localized discontinuous hard consistency, irregularity and nodularity with no bleeding noted in the transverse colon. Mult bx obtained. Path with chronic inflammation, no dysplasia seen  Last EGD: April 2022: normal esophagus, gastritis, normal duodenum  CT CAP 4/11/22: negative- no masses or inflammation of bowel, normal pancreas  MRI Prostate: unremarkable   (20 Jul 2023 21:40)        PAST MEDICAL & SURGICAL HISTORY:  BPH (benign prostatic hyperplasia)      Left inguinal hernia      HTN (hypertension)      Broken jaw  implantation of hardware          Allergies    ciprofloxacin (Urticaria (Mild))    Intolerances        MEDICATIONS  (STANDING):  amLODIPine   Tablet 10 milliGRAM(s) Oral daily  cefTRIAXone   IVPB 1000 milliGRAM(s) IV Intermittent every 24 hours  heparin   Injectable 5000 Unit(s) SubCutaneous every 8 hours  metroNIDAZOLE  IVPB 500 milliGRAM(s) IV Intermittent every 8 hours  pantoprazole  Injectable 40 milliGRAM(s) IV Push daily  tamsulosin 0.4 milliGRAM(s) Oral at bedtime    MEDICATIONS  (PRN):  acetaminophen   IVPB .. 1000 milliGRAM(s) IV Intermittent Once PRN Mild Pain (1 - 3)  diphenhydrAMINE Injectable 25 milliGRAM(s) IV Push once PRN Rash and/or Itching  ondansetron Injectable 4 milliGRAM(s) IV Push every 4 hours PRN Nausea and/or Vomiting      SOCIAL HISTORY:    FAMILY HISTORY:        Vital Signs Last 24 Hrs  T(C): 36.7 (21 Jul 2023 08:41), Max: 36.7 (21 Jul 2023 08:41)  T(F): 98 (21 Jul 2023 08:41), Max: 98 (21 Jul 2023 08:41)  HR: 69 (21 Jul 2023 08:41) (59 - 72)  BP: 127/82 (21 Jul 2023 08:41) (124/81 - 151/89)  BP(mean): 95 (21 Jul 2023 05:00) (95 - 95)  RR: 16 (21 Jul 2023 08:41) (16 - 19)  SpO2: 97% (21 Jul 2023 08:41) (97% - 100%)    Parameters below as of 21 Jul 2023 08:41  Patient On (Oxygen Delivery Method): room air        I&O's Summary    20 Jul 2023 07:01  -  21 Jul 2023 07:00  --------------------------------------------------------  IN: 0 mL / OUT: 100 mL / NET: -100 mL    21 Jul 2023 07:01  -  21 Jul 2023 10:08  --------------------------------------------------------  IN: 450 mL / OUT: 100 mL / NET: 350 mL        LABS:                        13.7   4.55  )-----------( 246      ( 21 Jul 2023 07:11 )             38.5     07-21    138  |  103  |  9   ----------------------------<  91  3.1<L>   |  25  |  0.91    Ca    8.9      21 Jul 2023 07:11  Phos  2.6     07-21  Mg     1.9     07-21    TPro  7.2  /  Alb  4.0  /  TBili  1.2  /  DBili  x   /  AST  13  /  ALT  9<L>  /  AlkPhos  126<H>  07-20    LIVER FUNCTIONS - ( 20 Jul 2023 20:26 )  Alb: 4.0 g/dL / Pro: 7.2 g/dL / ALK PHOS: 126 U/L / ALT: 9 U/L / AST: 13 U/L / GGT: x           PT/INR - ( 20 Jul 2023 20:26 )   PT: 13.1 sec;   INR: 1.10          PTT - ( 20 Jul 2023 20:26 )  PTT:40.3 sec  Urinalysis Basic - ( 21 Jul 2023 07:11 )    Color: x / Appearance: x / SG: x / pH: x  Gluc: 91 mg/dL / Ketone: x  / Bili: x / Urobili: x   Blood: x / Protein: x / Nitrite: x   Leuk Esterase: x / RBC: x / WBC x   Sq Epi: x / Non Sq Epi: x / Bacteria: x      CAPILLARY BLOOD GLUCOSE          Cultures:      PHYSICAL EXAM:  General: NAD, resting comfortably  HEENT: NC/AT, EOMI, normal hearing, no oral lesions, no LAD, neck supple  Pulmonary: Normal resp effort, CTA-B  Cardiovascular: NSR, no murmurs  Abdominal: Soft, ND/NT, no organomegaly  Groin: Soft, nontender, no ecchymosis/hematoma, no erythema, no edema.  Extremities: (+) DP/PT pulses. FROM, normal strength, no clubbing/cyanosis/erythema/edema  Neuro: A/O x 3, CNs II-XII grossly intact, normal sensation, no focal deficits  Pulses: Palpable distal pulses    RADIOLOGY & ADDITIONAL STUDIES:

## 2023-07-21 NOTE — CONSULT NOTE ADULT - SUBJECTIVE AND OBJECTIVE BOX
*** DRAFT NOTE ***    HPI:  68M h/o mild factor VIII deficiency, BPH c/b recurrent UTIs, L inguinal hernia, undergoing workup for ?Crohns referred to [facility] given appendicitis seen on CT enterography. GI is consulted in regards to his Crohns workup.    Pt was recently seen in Dr. Cronin's office as a second opinion for possible Crohns disease and was recommended to get a CT enterography and repeat colonoscopy. There was no evidence of small bowel inflammation on CT enterography though it did note an abnormal appendix that is diffusely distended/edematous measuring up to 11mm, thus he was referred to the ED for eval. He was seen by surgery who felt that he clinically did not have appendicitis.    In terms of his other GI symptoms, he reports 60-70 lb weight loss in the past 6 months from food avoidance due to abdominal pain. He experiences band-like pain around his lower abdomen with eating, but also reports significant constipation (requiring manual disimpaction in the past couple of weeks) and that his abdominal pain is better after defecation. No blood in stool, nausea, vomiting, dysphagia, odynophagia, fevers, chills, oral ulcers, visual sx, unexplained joint pains, or rash. He had milder versions of these symptoms prior hence prompting colonoscopy in 4/2022 (Dr. Sebastián Ni) which noted "discontinuous hard consistency, irregularity and nodularity" in the transverse colon with pathology showing chronic inflammation. Does not drink water but drinks iced tea. Does not take laxatives because in the past when he took them it caused diarrhea, but is open to trying Linzess which his daughter (who works at [employer]) will provide him samples of.    Allergies  ciprofloxacin (Urticaria (Mild))    Intolerances    Home Medications:  amLODIPine 10 mg oral tablet: 1 tab(s) orally once a day (14 Mar 2023 13:22)  famotidine 40 mg oral tablet: 1 tab(s) orally once a day (at bedtime) (14 Mar 2023 13:22)  ibuprofen 800 mg oral tablet: 1 tab(s) orally 3 times a day (14 Mar 2023 13:22)  omeprazole 40 mg oral delayed release capsule: 1 cap(s) orally once a day (14 Mar 2023 13:22)  tamsulosin 0.4 mg oral capsule: 1 cap(s) orally once a day (14 Mar 2023 13:22)    MEDICATIONS:  MEDICATIONS  (STANDING):  amLODIPine   Tablet 10 milliGRAM(s) Oral daily  cefTRIAXone   IVPB 1000 milliGRAM(s) IV Intermittent every 24 hours  heparin   Injectable 5000 Unit(s) SubCutaneous every 8 hours  metroNIDAZOLE  IVPB 500 milliGRAM(s) IV Intermittent every 8 hours  pantoprazole  Injectable 40 milliGRAM(s) IV Push daily  tamsulosin 0.4 milliGRAM(s) Oral at bedtime    MEDICATIONS  (PRN):  acetaminophen   IVPB .. 1000 milliGRAM(s) IV Intermittent Once PRN Mild Pain (1 - 3)  diphenhydrAMINE Injectable 25 milliGRAM(s) IV Push once PRN Rash and/or Itching  ondansetron Injectable 4 milliGRAM(s) IV Push every 4 hours PRN Nausea and/or Vomiting    PAST MEDICAL & SURGICAL HISTORY:  BPH (benign prostatic hyperplasia)  Left inguinal hernia  HTN (hypertension)  Broken jaw implantation of hardware    FAMILY HISTORY:  Denies FH of autoimmune conditions.    SOCIAL HISTORY:  Nonsmoker, denies EtOH use.    REVIEW OF SYSTEMS:  All other 10 review of systems is negative unless indicated above.    Vital Signs Last 24 Hrs  T(C): 36.7 (21 Jul 2023 08:41), Max: 36.7 (21 Jul 2023 08:41)  T(F): 98 (21 Jul 2023 08:41), Max: 98 (21 Jul 2023 08:41)  HR: 69 (21 Jul 2023 08:41) (59 - 72)  BP: 127/82 (21 Jul 2023 08:41) (124/81 - 151/89)  BP(mean): 95 (21 Jul 2023 05:00) (95 - 95)  RR: 16 (21 Jul 2023 08:41) (16 - 19)  SpO2: 97% (21 Jul 2023 08:41) (97% - 100%)    Parameters below as of 21 Jul 2023 08:41  Patient On (Oxygen Delivery Method): room air    07-20 @ 07:01  -  07-21 @ 07:00  --------------------------------------------------------  IN: 0 mL / OUT: 100 mL / NET: -100 mL    07-21 @ 07:01  -  07-21 @ 09:46  --------------------------------------------------------  IN: 450 mL / OUT: 100 mL / NET: 350 mL    PHYSICAL EXAM:  General: Alert, no acute distress  Lungs: Normal respiratory effort  Abdomen: Soft, tender in LLQ but not RLQ with deep palpation, no rebound or guarding  Extremities: Warm, no edema  Neurological: Moving all extremities spontaneously    LABS:              13.7   4.55  )-----------( 246      ( 21 Jul 2023 07:11 )             38.5     07-21    138  |  103  |  9   ----------------------------<  91  3.1<L>   |  25  |  0.91    Ca    8.9      21 Jul 2023 07:11  Phos  2.6     07-21  Mg     1.9     07-21    TPro  7.2  /  Alb  4.0  /  TBili  1.2  /  DBili  x   /  AST  13  /  ALT  9<L>  /  AlkPhos  126<H>  07-20    PT/INR - ( 20 Jul 2023 20:26 )   PT: 13.1 sec;   INR: 1.10     PTT - ( 20 Jul 2023 20:26 )  PTT:40.3 sec    Urinalysis with Rflx Culture (collected 20 Jul 2023 20:51)    RADIOLOGY & ADDITIONAL STUDIES:    HPI:  68M h/o mild factor VIII deficiency, BPH c/b recurrent UTIs, L inguinal hernia, undergoing workup for ?Crohns referred to [facility] given appendicitis seen on CT enterography. GI is consulted in regards to his Crohns workup.    Pt was recently seen in Dr. Cronin's office as a second opinion for possible Crohns disease and was recommended to get a CT enterography and repeat colonoscopy. There was no evidence of small bowel inflammation on CT enterography though it did note an abnormal appendix that is diffusely distended/edematous measuring up to 11mm, thus he was referred to the ED for eval. He was seen by surgery who felt that he clinically did not have appendicitis.    In terms of his other GI symptoms, he reports 60-70 lb weight loss in the past 6 months from food avoidance due to abdominal pain. He experiences band-like pain around his lower abdomen with eating, but also reports significant constipation (requiring manual disimpaction in the past couple of weeks) and that his abdominal pain is better after defecation. No blood in stool, nausea, vomiting, dysphagia, odynophagia, fevers, chills, oral ulcers, visual sx, unexplained joint pains, or rash. He had milder versions of these symptoms prior hence prompting colonoscopy in 4/2022 (Dr. Sebastián Ni) which noted "discontinuous hard consistency, irregularity and nodularity" in the transverse colon with pathology showing chronic inflammation. Does not drink water but drinks iced tea. Does not take laxatives because in the past when he took them it caused diarrhea, but is open to trying Linzess which his daughter (who works at "[employer] will provide him samples of.    Allergies  ciprofloxacin (Urticaria (Mild))    Intolerances    Home Medications:  amLODIPine 10 mg oral tablet: 1 tab(s) orally once a day (14 Mar 2023 13:22)  famotidine 40 mg oral tablet: 1 tab(s) orally once a day (at bedtime) (14 Mar 2023 13:22)  ibuprofen 800 mg oral tablet: 1 tab(s) orally 3 times a day (14 Mar 2023 13:22)  omeprazole 40 mg oral delayed release capsule: 1 cap(s) orally once a day (14 Mar 2023 13:22)  tamsulosin 0.4 mg oral capsule: 1 cap(s) orally once a day (14 Mar 2023 13:22)    MEDICATIONS:  MEDICATIONS  (STANDING):  amLODIPine   Tablet 10 milliGRAM(s) Oral daily  cefTRIAXone   IVPB 1000 milliGRAM(s) IV Intermittent every 24 hours  heparin   Injectable 5000 Unit(s) SubCutaneous every 8 hours  metroNIDAZOLE  IVPB 500 milliGRAM(s) IV Intermittent every 8 hours  pantoprazole  Injectable 40 milliGRAM(s) IV Push daily  tamsulosin 0.4 milliGRAM(s) Oral at bedtime    MEDICATIONS  (PRN):  acetaminophen   IVPB .. 1000 milliGRAM(s) IV Intermittent Once PRN Mild Pain (1 - 3)  diphenhydrAMINE Injectable 25 milliGRAM(s) IV Push once PRN Rash and/or Itching  ondansetron Injectable 4 milliGRAM(s) IV Push every 4 hours PRN Nausea and/or Vomiting    PAST MEDICAL & SURGICAL HISTORY:  BPH (benign prostatic hyperplasia)  Left inguinal hernia  HTN (hypertension)  Broken jaw implantation of hardware    FAMILY HISTORY:  Denies FH of autoimmune conditions.    SOCIAL HISTORY:  Nonsmoker, denies EtOH use.    REVIEW OF SYSTEMS:  All other 10 review of systems is negative unless indicated above.    Vital Signs Last 24 Hrs  T(C): 36.7 (21 Jul 2023 08:41), Max: 36.7 (21 Jul 2023 08:41)  T(F): 98 (21 Jul 2023 08:41), Max: 98 (21 Jul 2023 08:41)  HR: 69 (21 Jul 2023 08:41) (59 - 72)  BP: 127/82 (21 Jul 2023 08:41) (124/81 - 151/89)  BP(mean): 95 (21 Jul 2023 05:00) (95 - 95)  RR: 16 (21 Jul 2023 08:41) (16 - 19)  SpO2: 97% (21 Jul 2023 08:41) (97% - 100%)    Parameters below as of 21 Jul 2023 08:41  Patient On (Oxygen Delivery Method): room air    07-20 @ 07:01  -  07-21 @ 07:00  --------------------------------------------------------  IN: 0 mL / OUT: 100 mL / NET: -100 mL    07-21 @ 07:01  -  07-21 @ 09:46  --------------------------------------------------------  IN: 450 mL / OUT: 100 mL / NET: 350 mL    PHYSICAL EXAM:  General: Alert, no acute distress  Lungs: Normal respiratory effort  Abdomen: Soft, tender in LLQ but not RLQ with deep palpation, no rebound or guarding  Extremities: Warm, no edema  Neurological: Moving all extremities spontaneously    LABS:              13.7   4.55  )-----------( 246      ( 21 Jul 2023 07:11 )             38.5     07-21    138  |  103  |  9   ----------------------------<  91  3.1<L>   |  25  |  0.91    Ca    8.9      21 Jul 2023 07:11  Phos  2.6     07-21  Mg     1.9     07-21    TPro  7.2  /  Alb  4.0  /  TBili  1.2  /  DBili  x   /  AST  13  /  ALT  9<L>  /  AlkPhos  126<H>  07-20    PT/INR - ( 20 Jul 2023 20:26 )   PT: 13.1 sec;   INR: 1.10     PTT - ( 20 Jul 2023 20:26 )  PTT:40.3 sec    Urinalysis with Rflx Culture (collected 20 Jul 2023 20:51)    RADIOLOGY & ADDITIONAL STUDIES:

## 2023-07-21 NOTE — DISCHARGE NOTE NURSING/CASE MANAGEMENT/SOCIAL WORK - PATIENT PORTAL LINK FT
You can access the FollowMyHealth Patient Portal offered by [facility] by registering at the following website: [email or web address]. By joining [employer]’s FollowMyHealth portal, you will also be able to view your health information using other applications (apps) compatible with our system.

## 2023-07-22 LAB
CULTURE RESULTS: SIGNIFICANT CHANGE UP
SPECIMEN SOURCE: SIGNIFICANT CHANGE UP

## 2023-07-26 ENCOUNTER — NON-APPOINTMENT (OUTPATIENT)
Age: 68
End: 2023-07-26

## 2023-07-28 NOTE — END OF VISIT
[Time Spent: ___ minutes] : I have spent [unfilled] minutes of time on the encounter. [FreeTextEntry3] : History, exam and plan discussed with SHAKIR Rowley

## 2023-07-28 NOTE — ASSESSMENT
[FreeTextEntry1] : TAMMY STEWART is a 68 year old male with 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 Referred due to prolonged aPTT (39.9), work up revealed Factor VIII of 24%. Denies any personal or family history of bleeding. has extensive work up from referring provider Dr. Gonzales. Factor XI, IX, and VWF level are WNL. had multiple procedures including dental extractions, and jaw surgery with no bleeding. In anticipation of this visit patient was ordered Advate for factor infusion. levels will be drawn today. Our office spoke with his daughter Katherine (on HIPAA form) and there was no preference in FVIII product. In addition to TURP for enlarged prostate he will need inguinal hernia repair and b/l knee replacements. Denies any joint swelling or bleeding. Installed floors for man years causing arthritis in both knees. takes NSAIDs daily. \par Denies gum bleeds, nosebleed, hematuria, melena/hematochezia. \par \par Hemophilia\par -Education provided on Hemophilia, its implications and genetics. Discussed we can offer genetic testing at no cost. if Mr. Stewart has hemophilia all his daughters will be obligate carriers due to x-linked inheritance. Mr. Stewart consented to genetic testing through [facility].\par -Counseled against NSAID and ASA use, they cause qualitative platelet dysfunction and can increase bleeding risks. He reports that is helpful for his pain. He has been alternating with tylenol. He denies any bruising or bleeding including no dark tarry stools with taking NSAIDs daily. Discussed we can use celebrex and other methods but declined until diagnosis is confirmed. \par -Reviewed his work up so far has been negative except for low Factor VIII. inhibitor was tested on 6/23/23 and was negative. he has never received factor. \par -patient asked relevant questions and all answered. \par \par plan\par labs pre and post infusion of Advate, genetic testing. Will RTC in 24 hours for levels. Will call with results. \par \par

## 2023-07-28 NOTE — HISTORY OF PRESENT ILLNESS
[Epistaxis: 0 - No or trivial (<= 5 per year)] : Epistaxis: 0 - No or trivial (<= 5 per year) [Cutaneous: 0 - No or trivial (<= 1cm)] : Cutaneous: 0 - No or trivial (<= 1cm) [Minor wounds: 0 - No or trivial (<= 5 per year)] : Minor wounds: 0 - No or trivial (<= 5 per year) [Oral cavity: 0 - No] : Oral cavity: 0 - No [Gastrointestinal tract: 0  - No] : Gastrointestinal tract: 0  - No [Tooth extraction: 0 - None done or no bleeding in 1 extraction] : Tooth extraction: 0 - None done or no bleeding in 1 extraction [Surgery: 0 - None done or no bleeding in 1] : Surgery: 0 - None done or no bleeding in 1 [Menorrhagia: 0 - No] : Menorrhagia: 0 - No [Post-partum: 0 - No deliveries or no bleeding in 1 delivery] : Post-partum: 0 - No deliveries or no bleeding in 1 delivery [Muscle hematoma: 0 - Never] : Muscle hematoma: 0 - Never [Hemarthrosis: 0 - Never] : Hemarthrosis: 0 - Never [Small Intestinal Obstruction: Grade 1] : Central nervous system: 0 - Never [Post-circumcision: 0 - No] : Post-circumcision: 0 - No [Umbilical stump: 0 - No] : Umbilical stump: 0 - No [Cephalohematoma: 0 - No] : Cephalohematoma: 0 - No [Macroscopic hematuria: 0 - No] : Macroscopic hematuria: 0 - No [Post-venepuncture: 0 - No] : Post-venepuncture: 0 - No [Conjunctival hemorrage: 0 - No] : Conjunctival hemorrage: 0 - No [de-identified] : TAMMY STEWART is a 68 year old male with 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 Referred due to prolonged aPTT (39.9), work up revealed Factor VIII of 24%. Denies any personal or family history of bleeding. has extensive work up from referring provider Dr. Gonzales. Factor XI, IX, and VWF level are WNL. had multiple procedures including dental extractions, and jaw surgery with no bleeding. In anticipation of this visit patient was ordered Advate for factor infusion. levels will be drawn today. Our office spoke with his daughter Katherine (on HIPAA form) and there was no preference in FVIII product. In addition to TURP for enlarged prostate he will need inguinal hernia repair and b/l knee replacements. Denies any joint swelling or bleeding. Installed floors for man years causing arthritis in both knees. takes NSAIDs daily. \par Denies gum bleeds, nosebleed, hematuria, melena/hematochezia.  [de-identified] : 0

## 2023-08-01 DIAGNOSIS — K50.90 CROHN'S DISEASE, UNSPECIFIED, WITHOUT COMPLICATIONS: ICD-10-CM

## 2023-08-01 DIAGNOSIS — K59.09 OTHER CONSTIPATION: ICD-10-CM

## 2023-08-01 DIAGNOSIS — D66 HEREDITARY FACTOR VIII DEFICIENCY: ICD-10-CM

## 2023-08-01 DIAGNOSIS — I10 ESSENTIAL (PRIMARY) HYPERTENSION: ICD-10-CM

## 2023-08-01 DIAGNOSIS — Z88.1 ALLERGY STATUS TO OTHER ANTIBIOTIC AGENTS STATUS: ICD-10-CM

## 2023-08-01 DIAGNOSIS — N40.0 BENIGN PROSTATIC HYPERPLASIA WITHOUT LOWER URINARY TRACT SYMPTOMS: ICD-10-CM

## 2023-08-02 ENCOUNTER — NON-APPOINTMENT (OUTPATIENT)
Age: 68
End: 2023-08-02

## 2023-08-29 ENCOUNTER — APPOINTMENT (OUTPATIENT)
Dept: GASTROENTEROLOGY | Facility: HOSPITAL | Age: 68
End: 2023-08-29

## 2023-08-30 ENCOUNTER — NON-APPOINTMENT (OUTPATIENT)
Age: 68
End: 2023-08-30

## 2023-10-02 ENCOUNTER — APPOINTMENT (OUTPATIENT)
Dept: NEPHROLOGY | Facility: CLINIC | Age: 68
End: 2023-10-02

## 2023-12-07 ENCOUNTER — APPOINTMENT (OUTPATIENT)
Dept: HEMOPHILIA TREATMENT | Facility: HOSPITAL | Age: 68
End: 2023-12-07

## 2023-12-12 ENCOUNTER — APPOINTMENT (OUTPATIENT)
Dept: UROLOGY | Facility: CLINIC | Age: 68
End: 2023-12-12
Payer: MEDICARE

## 2023-12-12 DIAGNOSIS — R97.20 ELEVATED PROSTATE, SPECIFIC ANTIGEN [PSA]: ICD-10-CM

## 2023-12-12 PROCEDURE — 99214 OFFICE O/P EST MOD 30 MIN: CPT

## 2023-12-12 PROCEDURE — 51798 US URINE CAPACITY MEASURE: CPT

## 2023-12-13 ENCOUNTER — NON-APPOINTMENT (OUTPATIENT)
Age: 68
End: 2023-12-13

## 2023-12-18 ENCOUNTER — APPOINTMENT (OUTPATIENT)
Dept: MRI IMAGING | Facility: CLINIC | Age: 68
End: 2023-12-18
Payer: MEDICARE

## 2023-12-18 ENCOUNTER — RESULT REVIEW (OUTPATIENT)
Age: 68
End: 2023-12-18

## 2023-12-18 ENCOUNTER — OUTPATIENT (OUTPATIENT)
Dept: OUTPATIENT SERVICES | Facility: HOSPITAL | Age: 68
LOS: 1 days | End: 2023-12-18

## 2023-12-18 DIAGNOSIS — S02.609A FRACTURE OF MANDIBLE, UNSPECIFIED, INITIAL ENCOUNTER FOR CLOSED FRACTURE: Chronic | ICD-10-CM

## 2023-12-18 PROCEDURE — 72197 MRI PELVIS W/O & W/DYE: CPT | Mod: 26,MH

## 2023-12-18 PROCEDURE — 76498P: CUSTOM | Mod: 26,MH

## 2024-01-02 ENCOUNTER — OUTPATIENT (OUTPATIENT)
Dept: OUTPATIENT SERVICES | Age: 69
LOS: 1 days | End: 2024-01-02

## 2024-01-02 ENCOUNTER — APPOINTMENT (OUTPATIENT)
Dept: HEMOPHILIA TREATMENT | Facility: HOSPITAL | Age: 69
End: 2024-01-02

## 2024-01-02 DIAGNOSIS — N21.0 CALCULUS IN BLADDER: ICD-10-CM

## 2024-01-02 DIAGNOSIS — D66 HEREDITARY FACTOR VIII DEFICIENCY: ICD-10-CM

## 2024-01-02 DIAGNOSIS — Z87.19 PERSONAL HISTORY OF OTHER DISEASES OF THE DIGESTIVE SYSTEM: ICD-10-CM

## 2024-01-02 DIAGNOSIS — S80.219A ABRASION, UNSPECIFIED KNEE, INITIAL ENCOUNTER: ICD-10-CM

## 2024-01-02 DIAGNOSIS — Z78.9 OTHER SPECIFIED HEALTH STATUS: ICD-10-CM

## 2024-01-02 DIAGNOSIS — S02.609A FRACTURE OF MANDIBLE, UNSPECIFIED, INITIAL ENCOUNTER FOR CLOSED FRACTURE: Chronic | ICD-10-CM

## 2024-01-02 DIAGNOSIS — M17.9 OSTEOARTHRITIS OF KNEE, UNSPECIFIED: ICD-10-CM

## 2024-01-02 DIAGNOSIS — I10 ESSENTIAL (PRIMARY) HYPERTENSION: ICD-10-CM

## 2024-01-02 DIAGNOSIS — N40.0 BENIGN PROSTATIC HYPERPLASIA WITHOUT LOWER URINARY TRACT SYMPMS: ICD-10-CM

## 2024-01-03 LAB
ALBUMIN SERPL ELPH-MCNC: 4.4 G/DL
ALP BLD-CCNC: 112 U/L
ALT SERPL-CCNC: 9 U/L
ANION GAP SERPL CALC-SCNC: 11 MMOL/L
ANION GAP SERPL CALC-SCNC: 15 MMOL/L
APPEARANCE: CLEAR
APTT BLD: 38.2 SEC
AST SERPL-CCNC: 14 U/L
BACTERIA UR CULT: NORMAL
BACTERIA: ABNORMAL
BASOPHILS # BLD AUTO: 0.04 K/UL
BASOPHILS NFR BLD AUTO: 0.5 %
BILIRUB SERPL-MCNC: 0.9 MG/DL
BILIRUBIN URINE: NEGATIVE
BLOOD URINE: ABNORMAL
BUN SERPL-MCNC: 12 MG/DL
BUN SERPL-MCNC: 17 MG/DL
CALCIUM SERPL-MCNC: 9.3 MG/DL
CALCIUM SERPL-MCNC: 9.6 MG/DL
CHLORIDE SERPL-SCNC: 102 MMOL/L
CHLORIDE SERPL-SCNC: 103 MMOL/L
CO2 SERPL-SCNC: 21 MMOL/L
CO2 SERPL-SCNC: 25 MMOL/L
COLOR: YELLOW
CREAT SERPL-MCNC: 0.82 MG/DL
CREAT SERPL-MCNC: 0.95 MG/DL
EGFR: 88 ML/MIN/1.73M2
EGFR: 97 ML/MIN/1.73M2
EOSINOPHIL # BLD AUTO: 0.18 K/UL
EOSINOPHIL NFR BLD AUTO: 2.3 %
GLUCOSE QUALITATIVE U: NEGATIVE
GLUCOSE SERPL-MCNC: 79 MG/DL
GLUCOSE SERPL-MCNC: 82 MG/DL
HCT VFR BLD CALC: 38.9 %
HGB BLD-MCNC: 13.4 G/DL
HYALINE CASTS: 0 /LPF
IMM GRANULOCYTES NFR BLD AUTO: 0.4 %
INR PPP: 1.04 RATIO
INR PPP: 1.07 RATIO
KETONES URINE: NEGATIVE
LEUKOCYTE ESTERASE URINE: ABNORMAL
LYMPHOCYTES # BLD AUTO: 1.5 K/UL
LYMPHOCYTES NFR BLD AUTO: 18.9 %
MAN DIFF?: NORMAL
MCHC RBC-ENTMCNC: 29.5 PG
MCHC RBC-ENTMCNC: 34.4 GM/DL
MCV RBC AUTO: 85.5 FL
MICROSCOPIC-UA: NORMAL
MONOCYTES # BLD AUTO: 0.65 K/UL
MONOCYTES NFR BLD AUTO: 8.2 %
NEUTROPHILS # BLD AUTO: 5.54 K/UL
NEUTROPHILS NFR BLD AUTO: 69.7 %
NITRITE URINE: POSITIVE
PH URINE: 6.5
PLATELET # BLD AUTO: 272 K/UL
POTASSIUM SERPL-SCNC: 2.8 MMOL/L
POTASSIUM SERPL-SCNC: 4.1 MMOL/L
PROT SERPL-MCNC: 7.1 G/DL
PROTEIN URINE: ABNORMAL
PSA SERPL-MCNC: 5.58 NG/ML
PSA SERPL-MCNC: 6.05 NG/ML
PT BLD: 12.1 SEC
PT BLD: 12.4 SEC
RBC # BLD: 4.55 M/UL
RBC # FLD: 13 %
RED BLOOD CELLS URINE: 5 /HPF
SODIUM SERPL-SCNC: 138 MMOL/L
SODIUM SERPL-SCNC: 139 MMOL/L
SPECIFIC GRAVITY URINE: 1.02
SQUAMOUS EPITHELIAL CELLS: 0 /HPF
URINE COMMENTS: NORMAL
UROBILINOGEN URINE: ABNORMAL
WBC # FLD AUTO: 7.94 K/UL
WHITE BLOOD CELLS URINE: 20 /HPF

## 2024-01-03 NOTE — PHYSICAL EXAM
[] : decreased range of motion [Normal] : no cervical lymphadenopathy [Normal] : awake and interactive, normal strength tone throughout. [de-identified] : see PT note for details

## 2024-01-03 NOTE — ASSESSMENT
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 Discussed symptoms of joint and muscle bleeds and head trauma which could cause intracranial bleeding. Any pain, swelling, head trauma needs to be reported. If he has any major head trauma he needs to come to the ED with his emergency dose of factor which he will receive first and then a head CT will be performed to r/o an ICH. Symptoms of headache, vomiting need to be reported to [facility]. For upcoming TKR he will need a FVIII product Advate pre procedure, 12, 24 , 48 hrs post procedure and then qod x 1 week and TIW for another week. I discussed the rare possibility of developing an inhibitor and if he develops more pain and swelling after initial improvement he needs to contact the HT to change treatment. I briefly discussed pathophysiology of inhibitor development and treatment with rVIIa or FEIBA bypassing agents which bypass the requirement of FVIII and is sued for bleed management / prevention in inhibitor patients. 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 Both daughters will have a 50 % chance if they have boys and that the boys will have hemophilia A like him and they need to seek consultation with an [facility] to understand implications of the same; risk of having another boy with hemophilia.   He was given emergency contact info for the [facility], to call with any bleeds; will order factor for procedure and home nurse arrangements;

## 2024-01-03 NOTE — REVIEW OF SYSTEMS
[Negative] : Allergic/Immunologic [FreeTextEntry8] : per HPI [FreeTextEntry9] : per HPI [de-identified] : per HPI [FreeTextEntry1] : per HPI

## 2024-01-03 NOTE — END OF VISIT
[Time Spent: ___ minutes] : I have spent [unfilled] minutes of time on the encounter. [FreeTextEntry3] : History, exam and plan discussed with SHAKIR Rowley  [>50% of the face to face encounter time was spent on counseling and/or coordination of care for ___] : Greater than 50% of the face to face encounter time was spent on counseling and/or coordination of care for [unfilled]

## 2024-01-03 NOTE — HISTORY OF PRESENT ILLNESS
[Epistaxis: 0 - No or trivial (<= 5 per year)] : Epistaxis: 0 - No or trivial (<= 5 per year) [Cutaneous: 0 - No or trivial (<= 1cm)] : Cutaneous: 0 - No or trivial (<= 1cm) [Minor wounds: 0 - No or trivial (<= 5 per year)] : Minor wounds: 0 - No or trivial (<= 5 per year) [Oral cavity: 0 - No] : Oral cavity: 0 - No [Gastrointestinal tract: 0  - No] : Gastrointestinal tract: 0  - No [Tooth extraction: 0 - None done or no bleeding in 1 extraction] : Tooth extraction: 0 - None done or no bleeding in 1 extraction [Surgery: 0 - None done or no bleeding in 1] : Surgery: 0 - None done or no bleeding in 1 [Menorrhagia: 0 - No] : Menorrhagia: 0 - No [Post-partum: 0 - No deliveries or no bleeding in 1 delivery] : Post-partum: 0 - No deliveries or no bleeding in 1 delivery [Muscle hematoma: 0 - Never] : Muscle hematoma: 0 - Never [Hemarthrosis: 0 - Never] : Hemarthrosis: 0 - Never [Small Intestinal Obstruction: Grade 1] : Central nervous system: 0 - Never [Post-circumcision: 0 - No] : Post-circumcision: 0 - No [Umbilical stump: 0 - No] : Umbilical stump: 0 - No [Cephalohematoma: 0 - No] : Cephalohematoma: 0 - No [Macroscopic hematuria: 0 - No] : Macroscopic hematuria: 0 - No [Post-venepuncture: 0 - No] : Post-venepuncture: 0 - No [Conjunctival hemorrage: 0 - No] : Conjunctival hemorrage: 0 - No [de-identified] : TAMMY STEWART is a 68 year old male with 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 Referred due to prolonged aPTT (39.9), work up revealed Factor VIII of 24%. Denies any personal or family history of bleeding. has extensive work up from referring provider Dr. Gonzales. Factor XI, IX, and VWF level are WNL. had multiple procedures including dental extractions, and jaw surgery with no bleeding. In anticipation of this visit patient was ordered Advate for factor infusion. levels will be drawn today. Our office spoke with his daughter Katherine (on HIPAA form) and there was no preference in FVIII product. In addition to TURP for enlarged prostate he will need inguinal hernia repair and b/l knee replacements. Denies any joint swelling or bleeding. Installed floors for man years causing arthritis in both knees. takes NSAIDs daily.  Denies gum bleeds, nosebleed, hematuria, melena/hematochezia.   01/02/24: Mr Stewart is here for follow up to discuss plan for surgery. He is having rt. knee replacement (TKR) in Feb does not recall date with Dr Andres Cornejo at [facility]; currently in a lot of pain in bilateral knees with osteoarthritis likely related to his job as a  , recently diagnosed with mild FVIII deficiency ( 24%) ; takes Motrin 800 mg/ day alternating with Tylenol for pain; reports he has not had any bleeding issues his entire life following cuts, jaw implant and metal plate placement after broken jaw, wisdom teeth extractions , played basketball in HS; Saw Urology for BPH- seems all is stable and may not need TURP procedure ; takes meds for HT and hypercholesterolemia, Crohns disease [de-identified] : 0

## 2024-01-09 ENCOUNTER — APPOINTMENT (OUTPATIENT)
Dept: UROLOGY | Facility: CLINIC | Age: 69
End: 2024-01-09
Payer: MEDICARE

## 2024-01-09 VITALS
WEIGHT: 207 LBS | BODY MASS INDEX: 25.74 KG/M2 | HEIGHT: 75 IN | OXYGEN SATURATION: 98 % | SYSTOLIC BLOOD PRESSURE: 144 MMHG | HEART RATE: 61 BPM | DIASTOLIC BLOOD PRESSURE: 79 MMHG | TEMPERATURE: 98 F

## 2024-01-09 PROCEDURE — 51798 US URINE CAPACITY MEASURE: CPT

## 2024-01-09 PROCEDURE — 99213 OFFICE O/P EST LOW 20 MIN: CPT

## 2024-01-11 LAB — BACTERIA UR CULT: NORMAL

## 2024-01-22 ENCOUNTER — NON-APPOINTMENT (OUTPATIENT)
Age: 69
End: 2024-01-22

## 2024-02-01 ENCOUNTER — OUTPATIENT (OUTPATIENT)
Dept: OUTPATIENT SERVICES | Age: 69
LOS: 1 days | End: 2024-02-01

## 2024-02-01 ENCOUNTER — NON-APPOINTMENT (OUTPATIENT)
Age: 69
End: 2024-02-01

## 2024-02-01 DIAGNOSIS — S02.609A FRACTURE OF MANDIBLE, UNSPECIFIED, INITIAL ENCOUNTER FOR CLOSED FRACTURE: Chronic | ICD-10-CM

## 2024-02-01 RX ORDER — ANTIHEMOPHILIC FACTOR (RECOMBINANT) 2000 (+/-)
2000 KIT INTRAVENOUS
Qty: 15 | Refills: 0 | Status: ACTIVE | COMMUNITY
Start: 2023-07-14 | End: 1900-01-01

## 2024-02-22 DIAGNOSIS — D66 HEREDITARY FACTOR VIII DEFICIENCY: ICD-10-CM
